# Patient Record
Sex: MALE | Race: WHITE | HISPANIC OR LATINO | Employment: UNEMPLOYED | URBAN - METROPOLITAN AREA
[De-identification: names, ages, dates, MRNs, and addresses within clinical notes are randomized per-mention and may not be internally consistent; named-entity substitution may affect disease eponyms.]

---

## 2017-05-05 ENCOUNTER — TRANSCRIBE ORDERS (OUTPATIENT)
Dept: RADIOLOGY | Facility: HOSPITAL | Age: 67
End: 2017-05-05

## 2017-05-05 ENCOUNTER — HOSPITAL ENCOUNTER (OUTPATIENT)
Dept: RADIOLOGY | Facility: HOSPITAL | Age: 67
Discharge: HOME | End: 2017-05-05
Attending: INTERNAL MEDICINE
Payer: COMMERCIAL

## 2017-05-05 DIAGNOSIS — R05.9 COUGH: Primary | ICD-10-CM

## 2017-05-05 DIAGNOSIS — R05.9 COUGH: ICD-10-CM

## 2017-05-05 PROCEDURE — 71020 HB CHEST X-RAY 2VW FRONTAL&LATL: CPT

## 2017-12-06 ENCOUNTER — GENERIC CONVERSION - ENCOUNTER (OUTPATIENT)
Dept: OTHER | Facility: OTHER | Age: 67
End: 2017-12-06

## 2017-12-06 DIAGNOSIS — Z00.00 ENCOUNTER FOR GENERAL ADULT MEDICAL EXAMINATION WITHOUT ABNORMAL FINDINGS: ICD-10-CM

## 2017-12-06 DIAGNOSIS — Z12.5 ENCOUNTER FOR SCREENING FOR MALIGNANT NEOPLASM OF PROSTATE: ICD-10-CM

## 2017-12-27 ENCOUNTER — APPOINTMENT (OUTPATIENT)
Dept: LAB | Facility: CLINIC | Age: 67
End: 2017-12-27
Payer: COMMERCIAL

## 2017-12-27 DIAGNOSIS — Z00.00 ENCOUNTER FOR GENERAL ADULT MEDICAL EXAMINATION WITHOUT ABNORMAL FINDINGS: ICD-10-CM

## 2017-12-27 DIAGNOSIS — Z12.5 ENCOUNTER FOR SCREENING FOR MALIGNANT NEOPLASM OF PROSTATE: ICD-10-CM

## 2017-12-27 LAB
ALBUMIN SERPL BCP-MCNC: 4.3 G/DL (ref 3.5–5)
ALP SERPL-CCNC: 68 U/L (ref 46–116)
ALT SERPL W P-5'-P-CCNC: 20 U/L (ref 12–78)
ANION GAP SERPL CALCULATED.3IONS-SCNC: 7 MMOL/L (ref 4–13)
AST SERPL W P-5'-P-CCNC: 12 U/L (ref 5–45)
BILIRUB SERPL-MCNC: 0.49 MG/DL (ref 0.2–1)
BUN SERPL-MCNC: 15 MG/DL (ref 5–25)
CALCIUM SERPL-MCNC: 9.4 MG/DL (ref 8.3–10.1)
CHLORIDE SERPL-SCNC: 104 MMOL/L (ref 100–108)
CO2 SERPL-SCNC: 27 MMOL/L (ref 21–32)
CREAT SERPL-MCNC: 0.76 MG/DL (ref 0.6–1.3)
GFR SERPL CREATININE-BSD FRML MDRD: 96 ML/MIN/1.73SQ M
GLUCOSE P FAST SERPL-MCNC: 89 MG/DL (ref 65–99)
POTASSIUM SERPL-SCNC: 4.2 MMOL/L (ref 3.5–5.3)
PROT SERPL-MCNC: 8.6 G/DL (ref 6.4–8.2)
PSA SERPL-MCNC: 0.4 NG/ML (ref 0–4)
SODIUM SERPL-SCNC: 138 MMOL/L (ref 136–145)

## 2017-12-27 PROCEDURE — 80053 COMPREHEN METABOLIC PANEL: CPT

## 2017-12-27 PROCEDURE — 36415 COLL VENOUS BLD VENIPUNCTURE: CPT

## 2017-12-27 PROCEDURE — G0103 PSA SCREENING: HCPCS

## 2018-01-16 ENCOUNTER — APPOINTMENT (OUTPATIENT)
Dept: LAB | Facility: CLINIC | Age: 68
End: 2018-01-16
Payer: COMMERCIAL

## 2018-01-16 DIAGNOSIS — E88.09 OTHER DISORDERS OF PLASMA-PROTEIN METABOLISM, NOT ELSEWHERE CLASSIFIED: ICD-10-CM

## 2018-01-16 LAB
ALBUMIN SERPL BCP-MCNC: 4.3 G/DL (ref 3.5–5)
ALP SERPL-CCNC: 60 U/L (ref 46–116)
ALT SERPL W P-5'-P-CCNC: 23 U/L (ref 12–78)
ANION GAP SERPL CALCULATED.3IONS-SCNC: 7 MMOL/L (ref 4–13)
AST SERPL W P-5'-P-CCNC: 9 U/L (ref 5–45)
BILIRUB SERPL-MCNC: 0.62 MG/DL (ref 0.2–1)
BUN SERPL-MCNC: 12 MG/DL (ref 5–25)
CALCIUM SERPL-MCNC: 9.2 MG/DL (ref 8.3–10.1)
CHLORIDE SERPL-SCNC: 106 MMOL/L (ref 100–108)
CO2 SERPL-SCNC: 27 MMOL/L (ref 21–32)
CREAT SERPL-MCNC: 0.7 MG/DL (ref 0.6–1.3)
GFR SERPL CREATININE-BSD FRML MDRD: 99 ML/MIN/1.73SQ M
GLUCOSE P FAST SERPL-MCNC: 91 MG/DL (ref 65–99)
POTASSIUM SERPL-SCNC: 3.8 MMOL/L (ref 3.5–5.3)
PROT SERPL-MCNC: 8.4 G/DL (ref 6.4–8.2)
SODIUM SERPL-SCNC: 140 MMOL/L (ref 136–145)

## 2018-01-16 PROCEDURE — 36415 COLL VENOUS BLD VENIPUNCTURE: CPT

## 2018-01-16 PROCEDURE — 80053 COMPREHEN METABOLIC PANEL: CPT

## 2018-01-24 VITALS
HEART RATE: 120 BPM | WEIGHT: 171.52 LBS | HEIGHT: 65 IN | SYSTOLIC BLOOD PRESSURE: 130 MMHG | BODY MASS INDEX: 28.58 KG/M2 | DIASTOLIC BLOOD PRESSURE: 90 MMHG | TEMPERATURE: 98.4 F

## 2018-03-05 ENCOUNTER — TELEPHONE (OUTPATIENT)
Dept: INTERNAL MEDICINE CLINIC | Facility: CLINIC | Age: 68
End: 2018-03-05

## 2018-09-24 ENCOUNTER — OFFICE VISIT (OUTPATIENT)
Dept: INTERNAL MEDICINE CLINIC | Facility: CLINIC | Age: 68
End: 2018-09-24

## 2018-09-24 VITALS
WEIGHT: 170.64 LBS | SYSTOLIC BLOOD PRESSURE: 136 MMHG | TEMPERATURE: 98.9 F | DIASTOLIC BLOOD PRESSURE: 90 MMHG | HEART RATE: 96 BPM | BODY MASS INDEX: 28.4 KG/M2

## 2018-09-24 DIAGNOSIS — N40.0 BPH (BENIGN PROSTATIC HYPERPLASIA): Primary | ICD-10-CM

## 2018-09-24 DIAGNOSIS — R35.1 BPH ASSOCIATED WITH NOCTURIA: ICD-10-CM

## 2018-09-24 DIAGNOSIS — N40.1 BPH ASSOCIATED WITH NOCTURIA: ICD-10-CM

## 2018-09-24 PROCEDURE — 99212 OFFICE O/P EST SF 10 MIN: CPT | Performed by: INTERNAL MEDICINE

## 2018-09-24 RX ORDER — DOXAZOSIN 2 MG/1
2 TABLET ORAL
Qty: 90 TABLET | Refills: 1 | Status: SHIPPED | OUTPATIENT
Start: 2018-09-24 | End: 2018-12-04 | Stop reason: SINTOL

## 2018-09-24 NOTE — PROGRESS NOTES
Follow-up visit    ASSESSMENT/PLAN:  Diagnoses and all orders for this visit:      BPH associated with nocturia  · Patient having worsening symptoms of weak stream and nocturia for the past few months  · Will start patient on alpha-1 blocker to take one hour prior to sleeping  · Patient wants referral for Urology for evaluation  · Deferred rectal exam today  -     Ambulatory referral to Urology; Future  -     doxazosin (CARDURA) 2 mg tablet; Take 1 tablet (2 mg total) by mouth daily at bedtime Take 1 hour before bed      Health maintenance:  Secondary to no insurance patient refuses vaccinations  He has not had his colonoscopy performed  HISTORY OF PRESENTING ILLNESS:  Mary Lawson is a 79 y o  with PMH significant for benign prostatic hyperplasia and former nicotine use  Mary Lawson is in clinic today for prostate issues  Patient reports for the past few months he has had a weakened stream with urination  Patient also reports that he is nocturia with frequent awakening in the middle of the night  Patient reports he is able to have an erection however he reports that he gets a tingling sensation in his inner thigh and perineum area  He also intermittently reports his infection is not as strong as he used to be  He denies any dysuria, urinary frequency, penile discharge  Patient did not take any medications on a regular basis  Patient does not smoke  Patient has social alcohol use  Review of Systems   Constitutional: Negative for activity change (Chronic), appetite change, chills, fatigue, fever and unexpected weight change  HENT: Negative for trouble swallowing  Eyes: Negative for visual disturbance  Respiratory: Negative for apnea, cough, choking, shortness of breath and wheezing  Cardiovascular: Negative for chest pain, palpitations and leg swelling  Gastrointestinal: Negative for abdominal pain, constipation, diarrhea and vomiting     Genitourinary: Positive for decreased urine volume  Negative for dysuria  Nocturia     Musculoskeletal: Negative for arthralgias  Skin: Negative for color change  Neurological: Negative for weakness and headaches  Hematological: Negative for adenopathy  Psychiatric/Behavioral: Negative for agitation and behavioral problems  none    This patient  has a past surgical history that includes Hernia repair  Former smoker        OBJECTIVE:  Vitals:    09/24/18 1517   BP: 136/90   Pulse: 96   Temp: 98 9 °F (37 2 °C)   Weight: 77 4 kg (170 lb 10 2 oz)     Physical Exam   Constitutional: He is oriented to person, place, and time  He appears well-developed and well-nourished  No distress  HENT:   Head: Normocephalic and atraumatic  Mouth/Throat: No oropharyngeal exudate  Eyes: Conjunctivae are normal  Pupils are equal, round, and reactive to light  Cardiovascular: Normal rate, regular rhythm, normal heart sounds and intact distal pulses  Exam reveals no gallop and no friction rub  No murmur heard  Pulmonary/Chest: Effort normal and breath sounds normal  No respiratory distress  He has no wheezes  He has no rales  He exhibits no tenderness  Abdominal: Soft  Bowel sounds are normal  He exhibits no distension  There is no tenderness  There is no guarding  Genitourinary:   Genitourinary Comments: Patient refused   Musculoskeletal: Normal range of motion  He exhibits no edema or tenderness  Lymphadenopathy:     He has no cervical adenopathy  Neurological: He is alert and oriented to person, place, and time  No cranial nerve deficit  Skin: Skin is warm and dry  He is not diaphoretic  No erythema  Psychiatric: He has a normal mood and affect  His behavior is normal    Vitals reviewed          Current Outpatient Prescriptions:     bisacodyl (DULCOLAX) 5 mg EC tablet, Take by mouth, Disp: , Rfl:     doxazosin (CARDURA) 2 mg tablet, Take 1 tablet (2 mg total) by mouth daily at bedtime Take 1 hour before bed, Disp: 90 tablet, Rfl: 1    PEG 3350-KCl-NaBcb-NaCl-NaSulf (GOLYTELY) 227 1 g PACK, Take by mouth, Disp: , Rfl:     Past Medical History:   Diagnosis Date    Medical history unknown      Past Surgical History:   Procedure Laterality Date    HERNIA REPAIR      Last Assessed: 12/6/2017     Social History     Social History    Marital status: Single     Spouse name: N/A    Number of children: N/A    Years of education: N/A     Occupational History    Not on file  Social History Main Topics    Smoking status: Current Some Day Smoker    Smokeless tobacco: Never Used    Alcohol use Yes      Comment: Social Drinker     Drug use: No    Sexual activity: Not on file     Other Topics Concern    Not on file     Social History Narrative    No narrative on file     Family History   Problem Relation Age of Onset    Parkinsonism Mother         Symptomatic Parkinson Disease     Coronary artery disease Father     Heart attack Father        ==  Brad Leiva MD   Internal Medicine PGY-2  9/24/2018 4:45 PM    [address] [facility] , [address] [address] [facility] [facility]  Office: [phone]  Fax: [phone]

## 2018-12-04 ENCOUNTER — OFFICE VISIT (OUTPATIENT)
Dept: MULTI SPECIALTY CLINIC | Facility: CLINIC | Age: 68
End: 2018-12-04

## 2018-12-04 VITALS
WEIGHT: 171.96 LBS | DIASTOLIC BLOOD PRESSURE: 88 MMHG | BODY MASS INDEX: 28.65 KG/M2 | HEIGHT: 65 IN | HEART RATE: 84 BPM | TEMPERATURE: 97.6 F | SYSTOLIC BLOOD PRESSURE: 144 MMHG

## 2018-12-04 DIAGNOSIS — R35.1 BPH ASSOCIATED WITH NOCTURIA: ICD-10-CM

## 2018-12-04 DIAGNOSIS — N40.1 BPH ASSOCIATED WITH NOCTURIA: ICD-10-CM

## 2018-12-04 PROCEDURE — 99242 OFF/OP CONSLTJ NEW/EST SF 20: CPT | Performed by: UROLOGY

## 2018-12-04 RX ORDER — TAMSULOSIN HYDROCHLORIDE 0.4 MG/1
0.4 CAPSULE ORAL
Qty: 30 CAPSULE | Refills: 5 | Status: SHIPPED | OUTPATIENT
Start: 2018-12-04 | End: 2020-06-05

## 2018-12-04 NOTE — PROGRESS NOTES
Assessment/Plan:    No problem-specific Assessment & Plan notes found for this encounter  Diagnoses and all orders for this visit:    BPH associated with nocturia  -     Ambulatory referral to Urology  -     tamsulosin (FLOMAX) 0 4 mg; Take 1 capsule (0 4 mg total) by mouth daily with dinner  -     PSA Total, Diagnostic; Future          Subjective:      Patient ID: Sona Lopez is a 79 y o  male  Sona Lopez is a 57-year-old male who is referred for evaluation of BPH symptoms  Patient had been on Cardura 2 mg HS however stopped it 2 months later because of flank pain  He states his flank pain did subside after with discontinuing the drug  At this time he complains of relatively mild symptoms  He states he has a "low force" of his urinary stream   He only wakes 1-2 times nightly depending upon how much he drinks closer to bedtime  He denies dysuria, frequency, urgency, hematuria, or history of urinary tract infections  He also complains that at the end of ejaculation he gets a 'strange' feeling in the upper thighs, medial aspect that last for 1-2 seconds,that  he describes is " like a tremor"  His last PSA on December 27, 2017 was 0 4  The following portions of the patient's history were reviewed and updated as appropriate: allergies, current medications, past family history, past medical history, past social history, past surgical history and problem list     Review of Systems   Constitutional: Negative for appetite change, chills and fever  HENT: Negative  Eyes: Negative  Respiratory: Negative for cough, shortness of breath and wheezing  Cardiovascular: Negative for chest pain and leg swelling  Gastrointestinal: Negative for abdominal pain, nausea and vomiting  Endocrine: Negative  Genitourinary: Positive for difficulty urinating  Negative for decreased urine volume, dysuria, flank pain, frequency, hematuria and urgency  Musculoskeletal: Negative  Skin: Negative  Allergic/Immunologic: Negative  Hematological: Negative  Psychiatric/Behavioral: Negative  Objective:      /88 (BP Location: Right arm, Patient Position: Sitting, Cuff Size: Adult)   Pulse 84   Temp 97 6 °F (36 4 °C) (Oral)   Ht 5' 5" (1 651 m)   Wt 78 kg (171 lb 15 3 oz)   BMI 28 62 kg/m²          Physical Exam   Constitutional: He is oriented to person, place, and time  He appears well-developed and well-nourished  Cardiovascular: Normal rate, regular rhythm and normal heart sounds  Pulmonary/Chest: Effort normal and breath sounds normal    Abdominal: Soft  He exhibits no distension and no mass  There is no tenderness  There is no rebound and no guarding  Genitourinary:   Genitourinary Comments: Penis normal, circumcised, testes normal, prostate slightly enlarged but smooth without any nodules   Musculoskeletal: Normal range of motion  Neurological: He is alert and oriented to person, place, and time  Skin: Skin is warm and dry  This text is generated with voice recognition software  There may be translation, syntax,  or grammatical errors  If you have any questions, please contact the dictating provider        Sosa Duarte PA-C

## 2018-12-11 ENCOUNTER — APPOINTMENT (OUTPATIENT)
Dept: LAB | Facility: CLINIC | Age: 68
End: 2018-12-11

## 2018-12-11 DIAGNOSIS — N40.1 BPH ASSOCIATED WITH NOCTURIA: ICD-10-CM

## 2018-12-11 DIAGNOSIS — R35.1 BPH ASSOCIATED WITH NOCTURIA: ICD-10-CM

## 2018-12-11 LAB — PSA SERPL-MCNC: 0.4 NG/ML (ref 0–4)

## 2018-12-11 PROCEDURE — 84153 ASSAY OF PSA TOTAL: CPT

## 2019-05-29 ENCOUNTER — TELEPHONE (OUTPATIENT)
Dept: MULTI SPECIALTY CLINIC | Facility: CLINIC | Age: 69
End: 2019-05-29

## 2019-05-29 DIAGNOSIS — R35.1 BPH ASSOCIATED WITH NOCTURIA: Primary | ICD-10-CM

## 2019-05-29 DIAGNOSIS — N40.1 BPH ASSOCIATED WITH NOCTURIA: Primary | ICD-10-CM

## 2019-08-14 ENCOUNTER — TELEPHONE (OUTPATIENT)
Dept: INTERNAL MEDICINE CLINIC | Facility: CLINIC | Age: 69
End: 2019-08-14

## 2019-08-14 NOTE — TELEPHONE ENCOUNTER
Called and SANTIOM asking the patient if he is planning on scheduling his CRC  Advised him to give us call back at the office

## 2020-03-04 ENCOUNTER — APPOINTMENT (OUTPATIENT)
Dept: LAB | Facility: CLINIC | Age: 70
End: 2020-03-04

## 2020-03-04 DIAGNOSIS — I10 ESSENTIAL HYPERTENSION: Primary | ICD-10-CM

## 2020-03-04 DIAGNOSIS — N40.1 BENIGN PROSTATIC HYPERPLASIA WITH LOWER URINARY TRACT SYMPTOMS, SYMPTOM DETAILS UNSPECIFIED: ICD-10-CM

## 2020-03-04 LAB
ALBUMIN SERPL BCP-MCNC: 4 G/DL (ref 3.5–5)
ALP SERPL-CCNC: 61 U/L (ref 46–116)
ALT SERPL W P-5'-P-CCNC: 24 U/L (ref 12–78)
ANION GAP SERPL CALCULATED.3IONS-SCNC: 5 MMOL/L (ref 4–13)
AST SERPL W P-5'-P-CCNC: 10 U/L (ref 5–45)
BACTERIA UR QL AUTO: ABNORMAL /HPF
BASOPHILS # BLD AUTO: 0.07 THOUSANDS/ΜL (ref 0–0.1)
BASOPHILS NFR BLD AUTO: 1 % (ref 0–1)
BILIRUB SERPL-MCNC: 0.61 MG/DL (ref 0.2–1)
BILIRUB UR QL STRIP: NEGATIVE
BUN SERPL-MCNC: 16 MG/DL (ref 5–25)
CALCIUM SERPL-MCNC: 8.8 MG/DL (ref 8.3–10.1)
CHLORIDE SERPL-SCNC: 108 MMOL/L (ref 100–108)
CHOLEST SERPL-MCNC: 203 MG/DL (ref 50–200)
CLARITY UR: ABNORMAL
CO2 SERPL-SCNC: 26 MMOL/L (ref 21–32)
COLOR UR: YELLOW
CREAT SERPL-MCNC: 0.74 MG/DL (ref 0.6–1.3)
EOSINOPHIL # BLD AUTO: 0.17 THOUSAND/ΜL (ref 0–0.61)
EOSINOPHIL NFR BLD AUTO: 2 % (ref 0–6)
ERYTHROCYTE [DISTWIDTH] IN BLOOD BY AUTOMATED COUNT: 13.2 % (ref 11.6–15.1)
GFR SERPL CREATININE-BSD FRML MDRD: 95 ML/MIN/1.73SQ M
GLUCOSE P FAST SERPL-MCNC: 96 MG/DL (ref 65–99)
GLUCOSE UR STRIP-MCNC: NEGATIVE MG/DL
HCT VFR BLD AUTO: 43.8 % (ref 36.5–49.3)
HDLC SERPL-MCNC: 56 MG/DL
HGB BLD-MCNC: 14 G/DL (ref 12–17)
HGB UR QL STRIP.AUTO: ABNORMAL
HYALINE CASTS #/AREA URNS LPF: ABNORMAL /LPF
IMM GRANULOCYTES # BLD AUTO: 0.04 THOUSAND/UL (ref 0–0.2)
IMM GRANULOCYTES NFR BLD AUTO: 0 % (ref 0–2)
KETONES UR STRIP-MCNC: NEGATIVE MG/DL
LDLC SERPL CALC-MCNC: 122 MG/DL (ref 0–100)
LEUKOCYTE ESTERASE UR QL STRIP: NEGATIVE
LYMPHOCYTES # BLD AUTO: 3.19 THOUSANDS/ΜL (ref 0.6–4.47)
LYMPHOCYTES NFR BLD AUTO: 35 % (ref 14–44)
MCH RBC QN AUTO: 27.1 PG (ref 26.8–34.3)
MCHC RBC AUTO-ENTMCNC: 32 G/DL (ref 31.4–37.4)
MCV RBC AUTO: 85 FL (ref 82–98)
MONOCYTES # BLD AUTO: 0.82 THOUSAND/ΜL (ref 0.17–1.22)
MONOCYTES NFR BLD AUTO: 9 % (ref 4–12)
NEUTROPHILS # BLD AUTO: 4.87 THOUSANDS/ΜL (ref 1.85–7.62)
NEUTS SEG NFR BLD AUTO: 53 % (ref 43–75)
NITRITE UR QL STRIP: NEGATIVE
NON-SQ EPI CELLS URNS QL MICRO: ABNORMAL /HPF
NONHDLC SERPL-MCNC: 147 MG/DL
NRBC BLD AUTO-RTO: 0 /100 WBCS
PH UR STRIP.AUTO: 5.5 [PH]
PLATELET # BLD AUTO: 312 THOUSANDS/UL (ref 149–390)
PMV BLD AUTO: 10.2 FL (ref 8.9–12.7)
POTASSIUM SERPL-SCNC: 3.9 MMOL/L (ref 3.5–5.3)
PROT SERPL-MCNC: 7.8 G/DL (ref 6.4–8.2)
PROT UR STRIP-MCNC: NEGATIVE MG/DL
PSA SERPL-MCNC: 0.6 NG/ML (ref 0–4)
RBC # BLD AUTO: 5.16 MILLION/UL (ref 3.88–5.62)
RBC #/AREA URNS AUTO: ABNORMAL /HPF
SODIUM SERPL-SCNC: 139 MMOL/L (ref 136–145)
SP GR UR STRIP.AUTO: 1.03 (ref 1–1.03)
TRIGL SERPL-MCNC: 127 MG/DL
UROBILINOGEN UR QL STRIP.AUTO: 0.2 E.U./DL
WBC # BLD AUTO: 9.16 THOUSAND/UL (ref 4.31–10.16)
WBC #/AREA URNS AUTO: ABNORMAL /HPF

## 2020-03-04 PROCEDURE — 81001 URINALYSIS AUTO W/SCOPE: CPT

## 2020-03-04 PROCEDURE — G0103 PSA SCREENING: HCPCS

## 2020-03-04 PROCEDURE — 80061 LIPID PANEL: CPT

## 2020-03-04 PROCEDURE — 80053 COMPREHEN METABOLIC PANEL: CPT

## 2020-03-04 PROCEDURE — 36415 COLL VENOUS BLD VENIPUNCTURE: CPT

## 2020-03-04 PROCEDURE — 85025 COMPLETE CBC W/AUTO DIFF WBC: CPT

## 2020-04-23 ENCOUNTER — TELEPHONE (OUTPATIENT)
Dept: INTERNAL MEDICINE CLINIC | Facility: CLINIC | Age: 70
End: 2020-04-23

## 2020-06-04 ENCOUNTER — TELEPHONE (OUTPATIENT)
Dept: INTERNAL MEDICINE CLINIC | Facility: CLINIC | Age: 70
End: 2020-06-04

## 2020-06-05 ENCOUNTER — OFFICE VISIT (OUTPATIENT)
Dept: INTERNAL MEDICINE CLINIC | Facility: CLINIC | Age: 70
End: 2020-06-05

## 2020-06-05 VITALS
TEMPERATURE: 98.1 F | BODY MASS INDEX: 25.67 KG/M2 | HEIGHT: 67 IN | SYSTOLIC BLOOD PRESSURE: 158 MMHG | HEART RATE: 62 BPM | WEIGHT: 163.58 LBS | DIASTOLIC BLOOD PRESSURE: 88 MMHG

## 2020-06-05 DIAGNOSIS — N40.1 BPH ASSOCIATED WITH NOCTURIA: ICD-10-CM

## 2020-06-05 DIAGNOSIS — N52.9 ERECTILE DYSFUNCTION, UNSPECIFIED ERECTILE DYSFUNCTION TYPE: ICD-10-CM

## 2020-06-05 DIAGNOSIS — E78.5 HYPERLIPIDEMIA, UNSPECIFIED HYPERLIPIDEMIA TYPE: ICD-10-CM

## 2020-06-05 DIAGNOSIS — M19.041 OSTEOARTHRITIS OF BOTH HANDS, UNSPECIFIED OSTEOARTHRITIS TYPE: Primary | ICD-10-CM

## 2020-06-05 DIAGNOSIS — I10 ESSENTIAL HYPERTENSION: ICD-10-CM

## 2020-06-05 DIAGNOSIS — R35.1 BPH ASSOCIATED WITH NOCTURIA: ICD-10-CM

## 2020-06-05 DIAGNOSIS — M19.042 OSTEOARTHRITIS OF BOTH HANDS, UNSPECIFIED OSTEOARTHRITIS TYPE: Primary | ICD-10-CM

## 2020-06-05 PROCEDURE — 3077F SYST BP >= 140 MM HG: CPT | Performed by: INTERNAL MEDICINE

## 2020-06-05 PROCEDURE — 3079F DIAST BP 80-89 MM HG: CPT | Performed by: INTERNAL MEDICINE

## 2020-06-05 PROCEDURE — 99213 OFFICE O/P EST LOW 20 MIN: CPT | Performed by: INTERNAL MEDICINE

## 2020-06-05 PROCEDURE — 1160F RVW MEDS BY RX/DR IN RCRD: CPT | Performed by: INTERNAL MEDICINE

## 2020-06-05 PROCEDURE — 3008F BODY MASS INDEX DOCD: CPT | Performed by: INTERNAL MEDICINE

## 2020-06-05 PROCEDURE — 1036F TOBACCO NON-USER: CPT | Performed by: INTERNAL MEDICINE

## 2020-06-05 PROCEDURE — 4040F PNEUMOC VAC/ADMIN/RCVD: CPT | Performed by: INTERNAL MEDICINE

## 2020-06-05 RX ORDER — ACETAMINOPHEN 500 MG
1000 TABLET ORAL 2 TIMES DAILY PRN
Qty: 60 TABLET | Refills: 2 | Status: SHIPPED | OUTPATIENT
Start: 2020-06-05

## 2020-06-05 RX ORDER — SIMVASTATIN 20 MG
20 TABLET ORAL
Qty: 30 TABLET | Refills: 3 | Status: SHIPPED | OUTPATIENT
Start: 2020-06-05 | End: 2020-11-09 | Stop reason: ALTCHOICE

## 2020-06-05 RX ORDER — TADALAFIL 5 MG/1
5 TABLET ORAL DAILY PRN
Qty: 10 TABLET | Refills: 1 | Status: SHIPPED | OUTPATIENT
Start: 2020-06-05 | End: 2021-07-22 | Stop reason: SDUPTHER

## 2020-06-05 RX ORDER — DOXAZOSIN MESYLATE 1 MG/1
1 TABLET ORAL
Qty: 30 TABLET | Refills: 3 | Status: SHIPPED | OUTPATIENT
Start: 2020-06-05 | End: 2020-11-09 | Stop reason: ALTCHOICE

## 2020-11-09 ENCOUNTER — OFFICE VISIT (OUTPATIENT)
Dept: INTERNAL MEDICINE CLINIC | Facility: CLINIC | Age: 70
End: 2020-11-09

## 2020-11-09 VITALS
SYSTOLIC BLOOD PRESSURE: 144 MMHG | WEIGHT: 153.8 LBS | TEMPERATURE: 97.9 F | HEIGHT: 67 IN | DIASTOLIC BLOOD PRESSURE: 86 MMHG | BODY MASS INDEX: 24.14 KG/M2

## 2020-11-09 DIAGNOSIS — E78.5 HYPERLIPIDEMIA: ICD-10-CM

## 2020-11-09 DIAGNOSIS — Z23 NEED FOR PNEUMOCOCCAL VACCINATION: ICD-10-CM

## 2020-11-09 DIAGNOSIS — Z00.00 HEALTHCARE MAINTENANCE: ICD-10-CM

## 2020-11-09 DIAGNOSIS — M19.041 OSTEOARTHRITIS OF BOTH HANDS: ICD-10-CM

## 2020-11-09 DIAGNOSIS — R35.1 BPH ASSOCIATED WITH NOCTURIA: Primary | ICD-10-CM

## 2020-11-09 DIAGNOSIS — N40.1 BPH ASSOCIATED WITH NOCTURIA: Primary | ICD-10-CM

## 2020-11-09 DIAGNOSIS — M19.042 OSTEOARTHRITIS OF BOTH HANDS: ICD-10-CM

## 2020-11-09 PROCEDURE — 99214 OFFICE O/P EST MOD 30 MIN: CPT | Performed by: INTERNAL MEDICINE

## 2020-11-09 PROCEDURE — 90471 IMMUNIZATION ADMIN: CPT | Performed by: INTERNAL MEDICINE

## 2020-11-09 PROCEDURE — 90732 PPSV23 VACC 2 YRS+ SUBQ/IM: CPT | Performed by: INTERNAL MEDICINE

## 2020-11-09 RX ORDER — DOXAZOSIN MESYLATE 1 MG/1
1 TABLET ORAL
Qty: 90 TABLET | Refills: 3 | Status: SHIPPED | OUTPATIENT
Start: 2020-11-09 | End: 2021-11-09

## 2020-11-09 RX ORDER — ATORVASTATIN CALCIUM 40 MG/1
40 TABLET, FILM COATED ORAL DAILY
Qty: 90 TABLET | Refills: 3 | Status: SHIPPED | OUTPATIENT
Start: 2020-11-09 | End: 2020-11-09 | Stop reason: SDUPTHER

## 2020-11-09 RX ORDER — DOXAZOSIN MESYLATE 1 MG/1
1 TABLET ORAL
Qty: 90 TABLET | Refills: 3 | Status: SHIPPED | OUTPATIENT
Start: 2020-11-09 | End: 2020-11-09 | Stop reason: SDUPTHER

## 2020-11-09 RX ORDER — ATORVASTATIN CALCIUM 40 MG/1
40 TABLET, FILM COATED ORAL DAILY
Qty: 90 TABLET | Refills: 3 | Status: SHIPPED | OUTPATIENT
Start: 2020-11-09

## 2020-11-14 ENCOUNTER — LAB (OUTPATIENT)
Dept: LAB | Facility: HOSPITAL | Age: 70
End: 2020-11-14
Payer: COMMERCIAL

## 2020-11-14 DIAGNOSIS — Z00.00 HEALTHCARE MAINTENANCE: ICD-10-CM

## 2020-11-14 LAB
ALBUMIN SERPL BCP-MCNC: 3.9 G/DL (ref 3.5–5)
ALP SERPL-CCNC: 59 U/L (ref 46–116)
ALT SERPL W P-5'-P-CCNC: 22 U/L (ref 12–78)
ANION GAP SERPL CALCULATED.3IONS-SCNC: 5 MMOL/L (ref 4–13)
AST SERPL W P-5'-P-CCNC: 7 U/L (ref 5–45)
BASOPHILS # BLD AUTO: 0.06 THOUSANDS/ΜL (ref 0–0.1)
BASOPHILS NFR BLD AUTO: 1 % (ref 0–1)
BILIRUB SERPL-MCNC: 0.63 MG/DL (ref 0.2–1)
BUN SERPL-MCNC: 16 MG/DL (ref 5–25)
CALCIUM SERPL-MCNC: 9.1 MG/DL (ref 8.3–10.1)
CHLORIDE SERPL-SCNC: 107 MMOL/L (ref 100–108)
CO2 SERPL-SCNC: 27 MMOL/L (ref 21–32)
CREAT SERPL-MCNC: 0.64 MG/DL (ref 0.6–1.3)
EOSINOPHIL # BLD AUTO: 0.15 THOUSAND/ΜL (ref 0–0.61)
EOSINOPHIL NFR BLD AUTO: 2 % (ref 0–6)
ERYTHROCYTE [DISTWIDTH] IN BLOOD BY AUTOMATED COUNT: 13.3 % (ref 11.6–15.1)
GFR SERPL CREATININE-BSD FRML MDRD: 101 ML/MIN/1.73SQ M
GLUCOSE P FAST SERPL-MCNC: 93 MG/DL (ref 65–99)
HCT VFR BLD AUTO: 43.5 % (ref 36.5–49.3)
HCV AB SER QL: NORMAL
HGB BLD-MCNC: 13.6 G/DL (ref 12–17)
IMM GRANULOCYTES # BLD AUTO: 0.04 THOUSAND/UL (ref 0–0.2)
IMM GRANULOCYTES NFR BLD AUTO: 1 % (ref 0–2)
LYMPHOCYTES # BLD AUTO: 2.13 THOUSANDS/ΜL (ref 0.6–4.47)
LYMPHOCYTES NFR BLD AUTO: 29 % (ref 14–44)
MCH RBC QN AUTO: 26.7 PG (ref 26.8–34.3)
MCHC RBC AUTO-ENTMCNC: 31.3 G/DL (ref 31.4–37.4)
MCV RBC AUTO: 86 FL (ref 82–98)
MONOCYTES # BLD AUTO: 0.83 THOUSAND/ΜL (ref 0.17–1.22)
MONOCYTES NFR BLD AUTO: 11 % (ref 4–12)
NEUTROPHILS # BLD AUTO: 4.25 THOUSANDS/ΜL (ref 1.85–7.62)
NEUTS SEG NFR BLD AUTO: 56 % (ref 43–75)
NRBC BLD AUTO-RTO: 0 /100 WBCS
PLATELET # BLD AUTO: 367 THOUSANDS/UL (ref 149–390)
PMV BLD AUTO: 10.3 FL (ref 8.9–12.7)
POTASSIUM SERPL-SCNC: 3.9 MMOL/L (ref 3.5–5.3)
PROT SERPL-MCNC: 7.7 G/DL (ref 6.4–8.2)
RBC # BLD AUTO: 5.09 MILLION/UL (ref 3.88–5.62)
SODIUM SERPL-SCNC: 139 MMOL/L (ref 136–145)
WBC # BLD AUTO: 7.46 THOUSAND/UL (ref 4.31–10.16)

## 2020-11-14 PROCEDURE — 85025 COMPLETE CBC W/AUTO DIFF WBC: CPT

## 2020-11-14 PROCEDURE — 80053 COMPREHEN METABOLIC PANEL: CPT

## 2020-11-14 PROCEDURE — 86803 HEPATITIS C AB TEST: CPT

## 2020-11-14 PROCEDURE — 36415 COLL VENOUS BLD VENIPUNCTURE: CPT

## 2020-11-20 ENCOUNTER — APPOINTMENT (OUTPATIENT)
Dept: LAB | Facility: HOSPITAL | Age: 70
End: 2020-11-20

## 2020-11-20 DIAGNOSIS — Z00.00 HEALTHCARE MAINTENANCE: ICD-10-CM

## 2020-11-20 LAB — HEMOCCULT STL QL IA: NEGATIVE

## 2020-11-20 PROCEDURE — G0328 FECAL BLOOD SCRN IMMUNOASSAY: HCPCS

## 2021-01-14 ENCOUNTER — TELEPHONE (OUTPATIENT)
Dept: INTERNAL MEDICINE CLINIC | Facility: CLINIC | Age: 71
End: 2021-01-14

## 2021-01-14 NOTE — TELEPHONE ENCOUNTER
Pt is currently applying for medicare, he needs a letter stating that he has HTN, emailed to the daughter at [email or web address]

## 2021-01-15 NOTE — TELEPHONE ENCOUNTER
[place]   Write  A  Letter  To  Whom  It may  conern  That  The  Patient  Has  Been  Treated  For hypertension   Stephanie Osullivan

## 2021-01-26 ENCOUNTER — IMMUNIZATIONS (OUTPATIENT)
Dept: FAMILY MEDICINE CLINIC | Facility: HOSPITAL | Age: 71
End: 2021-01-26

## 2021-01-26 DIAGNOSIS — Z23 ENCOUNTER FOR IMMUNIZATION: Primary | ICD-10-CM

## 2021-01-26 PROCEDURE — 0001A SARS-COV-2 / COVID-19 MRNA VACCINE (PFIZER-BIONTECH) 30 MCG: CPT

## 2021-01-26 PROCEDURE — 91300 SARS-COV-2 / COVID-19 MRNA VACCINE (PFIZER-BIONTECH) 30 MCG: CPT

## 2021-02-18 ENCOUNTER — IMMUNIZATIONS (OUTPATIENT)
Dept: FAMILY MEDICINE CLINIC | Facility: HOSPITAL | Age: 71
End: 2021-02-18

## 2021-02-18 DIAGNOSIS — Z23 ENCOUNTER FOR IMMUNIZATION: Primary | ICD-10-CM

## 2021-02-18 PROCEDURE — 0002A SARS-COV-2 / COVID-19 MRNA VACCINE (PFIZER-BIONTECH) 30 MCG: CPT

## 2021-02-18 PROCEDURE — 91300 SARS-COV-2 / COVID-19 MRNA VACCINE (PFIZER-BIONTECH) 30 MCG: CPT

## 2021-07-22 ENCOUNTER — OFFICE VISIT (OUTPATIENT)
Dept: INTERNAL MEDICINE CLINIC | Facility: CLINIC | Age: 71
End: 2021-07-22

## 2021-07-22 ENCOUNTER — TELEPHONE (OUTPATIENT)
Dept: INTERNAL MEDICINE CLINIC | Facility: CLINIC | Age: 71
End: 2021-07-22

## 2021-07-22 VITALS
BODY MASS INDEX: 23.24 KG/M2 | WEIGHT: 148.4 LBS | SYSTOLIC BLOOD PRESSURE: 136 MMHG | TEMPERATURE: 97.1 F | DIASTOLIC BLOOD PRESSURE: 77 MMHG | HEART RATE: 74 BPM

## 2021-07-22 DIAGNOSIS — N52.9 ERECTILE DYSFUNCTION, UNSPECIFIED ERECTILE DYSFUNCTION TYPE: ICD-10-CM

## 2021-07-22 DIAGNOSIS — I10 ESSENTIAL HYPERTENSION: Primary | ICD-10-CM

## 2021-07-22 DIAGNOSIS — M19.041 OSTEOARTHRITIS OF BOTH HANDS, UNSPECIFIED OSTEOARTHRITIS TYPE: ICD-10-CM

## 2021-07-22 DIAGNOSIS — N40.1 BPH ASSOCIATED WITH NOCTURIA: ICD-10-CM

## 2021-07-22 DIAGNOSIS — M19.042 OSTEOARTHRITIS OF BOTH HANDS, UNSPECIFIED OSTEOARTHRITIS TYPE: ICD-10-CM

## 2021-07-22 DIAGNOSIS — Z12.12 SCREENING FOR COLORECTAL CANCER: ICD-10-CM

## 2021-07-22 DIAGNOSIS — Z12.5 SCREENING FOR PROSTATE CANCER: ICD-10-CM

## 2021-07-22 DIAGNOSIS — R35.1 BPH ASSOCIATED WITH NOCTURIA: ICD-10-CM

## 2021-07-22 DIAGNOSIS — Z12.11 SCREENING FOR COLORECTAL CANCER: ICD-10-CM

## 2021-07-22 PROCEDURE — 99213 OFFICE O/P EST LOW 20 MIN: CPT | Performed by: INTERNAL MEDICINE

## 2021-07-22 RX ORDER — TADALAFIL 5 MG/1
5 TABLET ORAL DAILY PRN
Qty: 10 TABLET | Refills: 1 | Status: SHIPPED | OUTPATIENT
Start: 2021-07-22 | End: 2021-11-09

## 2021-07-22 NOTE — TELEPHONE ENCOUNTER
Patient passed open access question  Can you please assist with scheduling procedure for October  Patient is Self Pay  Thank you

## 2021-07-22 NOTE — PROGRESS NOTES
INTERNAL MEDICINE OFFICE VISIT  [facility]  [address] [place] [address]    NAME: Agustina Strauss  AGE: 78 y o  SEX: male    DATE OF ENCOUNTER: 7/22/2021    Assessment and Plan     1  Essential hypertension  Patient's blood pressure was 136/77 at today's visit, improved from last few visits were -150s  He is adherent with his doxazosin medication after not being at the prior visits  --continue doxazosin 1mg qHS    2  Osteoarthritis of both hands, unspecified osteoarthritis type  Patient is a chronic history of osteoarthritis both hands, worse on the right with associated  weakness  Negative signs for carpal tunnel syndrome  Is well controlled with mobility exercises and Voltaren gel  - Diclofenac Sodium (VOLTAREN) 1 %; Apply 2 g topically 4 (four) times a day  Dispense: 350 g; Refill: 1    3  BPH associated with nocturia  Still experiences some symptoms, but doxazosin made it better  --  Continue doxazosin    4  Erectile dysfunction, unspecified erectile dysfunction type  Refilled patient's tadalafil  Instructed him not to take around the same time as doxazosin to prevent hypotensive episodes  Also counseled him that he should not take this medication every day and only as needed for sexual intercourse  - tadalafil (CIALIS) 5 MG tablet; Take 1 tablet (5 mg total) by mouth daily as needed for erectile dysfunction  Dispense: 10 tablet; Refill: 1    5  Screening for prostate cancer  Every year, the patient requests a PSA blood work  History of prostate cancer, and normal PSA is in the past   Will give labwork per patient request   - PSA, Total Screen; Future    6  Screening for colorectal cancer  The patient had been scheduled for a colonoscopy by past resident physicians  He even underwent all the prep, but due to issues with scheduling, he never had it done  No prior history of colonoscopy    Will give him a new referral to schedule with Gastroenterology  - Ambulatory referral to Gastroenterology; Future    Orders Placed This Encounter   Procedures    PSA, Total Screen    Ambulatory referral to Gastroenterology       Chief Complaint     Chief Complaint   Patient presents with    Follow-up       History of Present Illness       Patient is a 81-year-old male with a PMH bilateral hand osteoarthritis, hyperlipidemia, hypertension, BPH, and erectile dysfunction presents for follow-up of his chronic conditions  He is feeling well and has no complaints outside of his normal hand aches and pains  He says that pain gets worse with increased activity and sometimes at night if he has done a lot in the daytime  No issues with paresthesia or new onset weakness  Voltaren gel has helped his symptoms in the past  For his blood pressure, he continues to be adherent with doxazosin and reports no adverse effects  First hyperlipidemia he is also on atorvastatin and reports no adverse effects  He would like a refill of his Cialis  The following portions of the patient's history were reviewed and updated as appropriate: allergies, current medications, past family history, past medical history, past social history, past surgical history and problem list     Review of Systems     Review of Systems   Constitutional: Negative for chills and fever  HENT: Negative for ear pain and sore throat  Eyes: Negative for pain and visual disturbance  Respiratory: Negative for cough and shortness of breath  Cardiovascular: Negative for chest pain and palpitations  Gastrointestinal: Negative for abdominal pain and vomiting  Genitourinary: Negative for dysuria and hematuria  Musculoskeletal: Positive for arthralgias (In his hands) and myalgias  Negative for back pain  Skin: Negative for color change and rash  Neurological: Positive for weakness (Chronic right )  Negative for seizures and syncope     All other systems reviewed and are negative  Active Problem List     Patient Active Problem List   Diagnosis    BPH associated with nocturia    Osteoarthritis of both hands    Essential hypertension    Erectile dysfunction       Objective     /77 (BP Location: Left arm, Patient Position: Sitting, Cuff Size: Standard)   Pulse 74   Temp (!) 97 1 °F (36 2 °C) (Temporal)   Wt 67 3 kg (148 lb 6 4 oz)   BMI 23 24 kg/m²     Physical Exam  Vitals reviewed  Constitutional:       Appearance: Normal appearance  He is not ill-appearing, toxic-appearing or diaphoretic  HENT:      Head: Normocephalic and atraumatic  Mouth/Throat:      Mouth: Mucous membranes are moist       Pharynx: Oropharynx is clear  Eyes:      General: No scleral icterus  Conjunctiva/sclera: Conjunctivae normal    Cardiovascular:      Rate and Rhythm: Normal rate and regular rhythm  Heart sounds: No murmur heard  No gallop  Pulmonary:      Effort: Pulmonary effort is normal       Breath sounds: Normal breath sounds  No wheezing, rhonchi or rales  Abdominal:      General: Bowel sounds are normal  There is no distension  Palpations: Abdomen is soft  Tenderness: There is no abdominal tenderness  Musculoskeletal:         General: No swelling, tenderness, deformity or signs of injury  Right lower leg: No edema  Left lower leg: No edema  Comments: Decreased mobility in bilateral hands and wrists, no observable deformity or swelling; negative Tinel's and Phalen's sign bilaterally  Skin:     General: Skin is warm  Capillary Refill: Capillary refill takes less than 2 seconds  Coloration: Skin is not pale  Findings: No erythema or rash  Neurological:      General: No focal deficit present  Mental Status: He is alert and oriented to person, place, and time  Motor: Weakness (4/5 right ) present     Psychiatric:         Mood and Affect: Mood normal          Behavior: Behavior normal          Thought Content:  Thought content normal          Judgment: Judgment normal          Pertinent Laboratory/Diagnostic Studies:  CBC:   Lab Results   Component Value Date/Time    WBC 7 46 11/14/2020 10:00 AM    RBC 5 09 11/14/2020 10:00 AM    HGB 13 6 11/14/2020 10:00 AM    HCT 43 5 11/14/2020 10:00 AM    MCV 86 11/14/2020 10:00 AM    MCH 26 7 (L) 11/14/2020 10:00 AM    MCHC 31 3 (L) 11/14/2020 10:00 AM    RDW 13 3 11/14/2020 10:00 AM    MPV 10 3 11/14/2020 10:00 AM     11/14/2020 10:00 AM    NRBC 0 11/14/2020 10:00 AM    NEUTOPHILPCT 56 11/14/2020 10:00 AM    LYMPHOPCT 29 11/14/2020 10:00 AM    MONOPCT 11 11/14/2020 10:00 AM    EOSPCT 2 11/14/2020 10:00 AM    BASOPCT 1 11/14/2020 10:00 AM    NEUTROABS 4 25 11/14/2020 10:00 AM    LYMPHSABS 2 13 11/14/2020 10:00 AM    MONOSABS 0 83 11/14/2020 10:00 AM    EOSABS 0 15 11/14/2020 10:00 AM     Chemistry Profile:   Lab Results   Component Value Date/Time    K 3 9 11/14/2020 10:00 AM     11/14/2020 10:00 AM    CO2 27 11/14/2020 10:00 AM    BUN 16 11/14/2020 10:00 AM    CREATININE 0 64 11/14/2020 10:00 AM    GLUF 93 11/14/2020 10:00 AM    CALCIUM 9 1 11/14/2020 10:00 AM    AST 7 11/14/2020 10:00 AM    ALT 22 11/14/2020 10:00 AM    ALKPHOS 59 11/14/2020 10:00 AM    EGFR 101 11/14/2020 10:00 AM     Coagulation Studies: No results found for: PROTIME, INR, PTT  Cardiac Studies: No results found for: NTBNP, BNP, TROPONINI, POCTROP  Hepatology: No results found for: LIPASE, AMMONIA, AFP  Endocrine Studies:   Lab Results   Component Value Date/Time    TRIG 127 03/04/2020 08:14 AM    CHOLESTEROL 203 (H) 03/04/2020 08:14 AM    HDL 56 03/04/2020 08:14 AM    LDLCALC 122 (H) 03/04/2020 08:14 AM    NONHDLC 147 03/04/2020 08:14 AM     Iron Studies: No results found for: LABIRON, IRON, TIBC, FERRITIN  Health Maintenance:   Lab Results   Component Value Date/Time    PSA 0 6 03/04/2020 08:14 AM    HEPCAB Non-reactive 11/14/2020 10:00 AM         Current Medications     Current Outpatient Medications:     acetaminophen (TYLENOL) 500 mg tablet, Take 2 tablets (1,000 mg total) by mouth 2 (two) times a day as needed for mild pain, Disp: 60 tablet, Rfl: 2    atorvastatin (LIPITOR) 40 mg tablet, Take 1 tablet (40 mg total) by mouth daily, Disp: 90 tablet, Rfl: 3    doxazosin (CARDURA) 1 mg tablet, Take 1 tablet (1 mg total) by mouth daily at bedtime, Disp: 90 tablet, Rfl: 3    tadalafil (CIALIS) 5 MG tablet, Take 1 tablet (5 mg total) by mouth daily as needed for erectile dysfunction, Disp: 10 tablet, Rfl: 1    Diclofenac Sodium (VOLTAREN) 1 %, Apply 2 g topically 4 (four) times a day, Disp: 350 g, Rfl: 1    Health Maintenance     Health Maintenance   Topic Date Due    Annual Physical  Never done    Influenza Vaccine (1) 09/01/2021    Depression Screening PHQ  11/09/2021    BMI: Adult  11/09/2021    DTaP,Tdap,and Td Vaccines (1 - Tdap) 07/22/2022 (Originally 9/23/1971)    Fall Risk  11/09/2021    Colorectal Cancer Screening  11/20/2021    Hepatitis C Screening  Completed    Pneumococcal Vaccine: 65+ Years  Completed    COVID-19 Vaccine  Completed    HIB Vaccine  Aged Out    Hepatitis B Vaccine  Aged Out    IPV Vaccine  Aged Out    Hepatitis A Vaccine  Aged Out    Meningococcal ACWY Vaccine  Aged Out    HPV Vaccine  Aged Out     Immunization History   Administered Date(s) Administered    Influenza Quadrivalent, 6-35 Months IM 12/06/2017    Influenza, high dose seasonal 0 7 mL 10/22/2020    Pneumococcal Conjugate 13-Valent 12/06/2017    Pneumococcal Polysaccharide PPV23 11/09/2020    SARS-CoV-2 / COVID-19 mRNA IM (Pfizer-BioNTech) 01/26/2021, 02/18/2021       Radha Jeff DO  Internal Medicine  PGY-2  7/22/2021 4:30 PM

## 2021-11-09 ENCOUNTER — OFFICE VISIT (OUTPATIENT)
Dept: INTERNAL MEDICINE CLINIC | Facility: CLINIC | Age: 71
End: 2021-11-09

## 2021-11-09 VITALS
WEIGHT: 153.2 LBS | HEART RATE: 81 BPM | BODY MASS INDEX: 23.99 KG/M2 | SYSTOLIC BLOOD PRESSURE: 147 MMHG | OXYGEN SATURATION: 96 % | DIASTOLIC BLOOD PRESSURE: 92 MMHG | TEMPERATURE: 97.9 F

## 2021-11-09 DIAGNOSIS — I10 ESSENTIAL HYPERTENSION: Primary | ICD-10-CM

## 2021-11-09 DIAGNOSIS — R35.1 BPH ASSOCIATED WITH NOCTURIA: ICD-10-CM

## 2021-11-09 DIAGNOSIS — N40.1 BPH ASSOCIATED WITH NOCTURIA: ICD-10-CM

## 2021-11-09 PROCEDURE — 99213 OFFICE O/P EST LOW 20 MIN: CPT | Performed by: INTERNAL MEDICINE

## 2021-11-09 RX ORDER — TERAZOSIN 2 MG/1
2 CAPSULE ORAL
Qty: 30 CAPSULE | Refills: 1 | Status: SHIPPED | OUTPATIENT
Start: 2021-11-09 | End: 2022-01-10 | Stop reason: SDUPTHER

## 2021-12-08 ENCOUNTER — OFFICE VISIT (OUTPATIENT)
Dept: INTERNAL MEDICINE CLINIC | Facility: CLINIC | Age: 71
End: 2021-12-08

## 2021-12-08 VITALS
DIASTOLIC BLOOD PRESSURE: 76 MMHG | SYSTOLIC BLOOD PRESSURE: 129 MMHG | BODY MASS INDEX: 24.96 KG/M2 | TEMPERATURE: 98.2 F | WEIGHT: 159 LBS | HEIGHT: 67 IN | HEART RATE: 101 BPM

## 2021-12-08 DIAGNOSIS — I10 ESSENTIAL HYPERTENSION: ICD-10-CM

## 2021-12-08 DIAGNOSIS — M19.042 OSTEOARTHRITIS OF BOTH HANDS, UNSPECIFIED OSTEOARTHRITIS TYPE: ICD-10-CM

## 2021-12-08 DIAGNOSIS — Z12.12 SCREENING FOR COLORECTAL CANCER: ICD-10-CM

## 2021-12-08 DIAGNOSIS — M19.041 OSTEOARTHRITIS OF BOTH HANDS, UNSPECIFIED OSTEOARTHRITIS TYPE: ICD-10-CM

## 2021-12-08 DIAGNOSIS — Z12.11 SCREENING FOR COLORECTAL CANCER: ICD-10-CM

## 2021-12-08 DIAGNOSIS — N40.1 BPH ASSOCIATED WITH NOCTURIA: ICD-10-CM

## 2021-12-08 DIAGNOSIS — R35.1 BPH ASSOCIATED WITH NOCTURIA: ICD-10-CM

## 2021-12-08 DIAGNOSIS — N52.9 ERECTILE DYSFUNCTION, UNSPECIFIED ERECTILE DYSFUNCTION TYPE: ICD-10-CM

## 2021-12-08 DIAGNOSIS — Z23 NEED FOR INFLUENZA VACCINATION: Primary | ICD-10-CM

## 2021-12-08 PROCEDURE — 99213 OFFICE O/P EST LOW 20 MIN: CPT | Performed by: INTERNAL MEDICINE

## 2021-12-08 PROCEDURE — 90662 IIV NO PRSV INCREASED AG IM: CPT | Performed by: INTERNAL MEDICINE

## 2021-12-08 PROCEDURE — 90471 IMMUNIZATION ADMIN: CPT | Performed by: INTERNAL MEDICINE

## 2021-12-08 RX ORDER — TADALAFIL 5 MG/1
5 TABLET ORAL DAILY PRN
Qty: 10 TABLET | Refills: 0 | Status: SHIPPED | OUTPATIENT
Start: 2021-12-08

## 2021-12-30 ENCOUNTER — APPOINTMENT (OUTPATIENT)
Dept: LAB | Facility: HOSPITAL | Age: 71
End: 2021-12-30
Attending: INTERNAL MEDICINE

## 2021-12-30 DIAGNOSIS — Z12.5 SCREENING FOR PROSTATE CANCER: ICD-10-CM

## 2021-12-30 DIAGNOSIS — Z12.12 SCREENING FOR COLORECTAL CANCER: ICD-10-CM

## 2021-12-30 DIAGNOSIS — Z12.11 SCREENING FOR COLORECTAL CANCER: ICD-10-CM

## 2021-12-30 LAB — HEMOCCULT STL QL IA: NEGATIVE

## 2021-12-30 PROCEDURE — 36415 COLL VENOUS BLD VENIPUNCTURE: CPT

## 2021-12-30 PROCEDURE — G0328 FECAL BLOOD SCRN IMMUNOASSAY: HCPCS

## 2022-01-10 DIAGNOSIS — R35.1 BPH ASSOCIATED WITH NOCTURIA: ICD-10-CM

## 2022-01-10 DIAGNOSIS — N40.1 BPH ASSOCIATED WITH NOCTURIA: ICD-10-CM

## 2022-01-10 DIAGNOSIS — I10 ESSENTIAL HYPERTENSION: ICD-10-CM

## 2022-01-10 RX ORDER — TERAZOSIN 2 MG/1
2 CAPSULE ORAL
Qty: 30 CAPSULE | Refills: 1 | Status: SHIPPED | OUTPATIENT
Start: 2022-01-10 | End: 2022-01-18 | Stop reason: SDUPTHER

## 2022-01-10 NOTE — TELEPHONE ENCOUNTER
Requested Prescriptions     Pending Prescriptions Disp Refills    terazosin (HYTRIN) 2 mg capsule 30 capsule 1     Sig: Take 1 capsule (2 mg total) by mouth daily at bedtime

## 2022-01-14 NOTE — PROGRESS NOTES
1/18/2022    [facility]  1950  [id number]      Assessment  -BPH with lower urinary tract symptoms  -Prostate cancer screening  -Erectile dysfunction     Discussion/Plan  Asher Frances is a 73 y o  male who presents in consultation     1  BPH with lower urinary tract symptoms- PVR in the office today is 39 mL  He reports improvement in his urinary pattern since PCP started him on terazosin 2 mg daily  Prescription refill was electronically sent to his pharmacy  Reviewed dietary and behavioral modifications  2  Prostate cancer screening- results of PSA remain pending  We will call once they are finalized  There were no significant findings noted on digital rectal examination today  3  Erectile dysfunction- continue Cialis as needed prior to sexual activity  Call with results of recent PSA  If findings are stable, he will return to the office in 1 year with PSA, LAVINIA, and PVR assessment  Patient was instructed to call sooner with any issues     -All questions answered, patient agrees with plan      History of Present Illness  73 y o  male who presents in consultation today for evaluation of BPH and prostate cancer screening  Patient referred by his PCP  Last PSA from 3/4/2020 was 0 6  He is currently on terazosin 2mg daily and Cialis 5mg as needed prior to sexual activity  Patient reports improvement in his urinary pattern since PCP started him on terazosin  He denies any gross hematuria or dysuria  Patient denies any strong family history of prostate cancer  He denies any prior urologic history, surgical intervention, or instrumentation  Review of Systems  Review of Systems   Constitutional: Negative  HENT: Negative  Respiratory: Negative  Cardiovascular: Negative  Gastrointestinal: Negative  Genitourinary: Negative for decreased urine volume, difficulty urinating, dysuria, flank pain, frequency, hematuria and urgency  Musculoskeletal: Negative  Skin: Negative  Neurological: Negative  Psychiatric/Behavioral: Negative  AUA SYMPTOM SCORE      Most Recent Value   AUA SYMPTOM SCORE    How often have you had a sensation of not emptying your bladder completely after you finished urinating? 1   How often have you had to urinate again less than two hours after you finished urinating? 0   How often have you found you stopped and started again several times when you urinate? 0   How often have you found it difficult to postpone urination? 0   How often have you had a weak urinary stream? 0   How often have you had to push or strain to begin urination? 0   How many times did you most typically get up to urinate from the time you went to bed at night until the time you got up in the morning?  1   Quality of Life: If you were to spend the rest of your life with your urinary condition just the way it is now, how would you feel about that? 2   AUA SYMPTOM SCORE 2          Past Medical History  Past Medical History:   Diagnosis Date    Medical history unknown        Past Social History  Past Surgical History:   Procedure Laterality Date    HERNIA REPAIR      Last Assessed: 12/6/2017       Past Family History  Family History   Problem Relation Age of Onset    Parkinsonism Mother         Symptomatic Parkinson Disease     Coronary artery disease Father     Heart attack Father        Past Social history  Social History     Socioeconomic History    Marital status: Single     Spouse name: Not on file    Number of children: Not on file    Years of education: Not on file    Highest education level: Not on file   Occupational History    Not on file   Tobacco Use    Smoking status: Former Smoker    Smokeless tobacco: Never Used   Vaping Use    Vaping Use: Never used   Substance and Sexual Activity    Alcohol use: Yes     Comment: Social Drinker     Drug use: No    Sexual activity: Not on file   Other Topics Concern    Not on file   Social History Narrative    Not on file Social Determinants of Health     Financial Resource Strain: Low Risk     Difficulty of Paying Living Expenses: Not hard at all   Food Insecurity: No Food Insecurity    Worried About Running Out of Food in the Last Year: Never true    Saw of Food in the Last Year: Never true   Transportation Needs: No Transportation Needs    Lack of Transportation (Medical): No    Lack of Transportation (Non-Medical): No   Physical Activity: Inactive    Days of Exercise per Week: 0 days    Minutes of Exercise per Session: 0 min   Stress: Not on file   Social Connections: Not on file   Intimate Partner Violence: Not on file   Housing Stability: Not on file       Current Medications  Current Outpatient Medications   Medication Sig Dispense Refill    acetaminophen (TYLENOL) 500 mg tablet Take 2 tablets (1,000 mg total) by mouth 2 (two) times a day as needed for mild pain 60 tablet 2    atorvastatin (LIPITOR) 40 mg tablet Take 1 tablet (40 mg total) by mouth daily 90 tablet 3    tadalafil (CIALIS) 5 MG tablet Take 1 tablet (5 mg total) by mouth daily as needed for erectile dysfunction 10 tablet 0    terazosin (HYTRIN) 2 mg capsule Take 1 capsule (2 mg total) by mouth daily at bedtime 30 capsule 11     No current facility-administered medications for this visit  Allergies  No Known Allergies    Past Medical History, Social History, Family History, medications and allergies were reviewed  Vitals  Vitals:    01/18/22 1114   BP: 138/94   Pulse: 104   SpO2: 96%   Weight: 70 3 kg (155 lb)   Height: 5' 7" (1 702 m)       Physical Exam  Physical Exam  Constitutional:       Appearance: Normal appearance  He is well-developed  HENT:      Head: Normocephalic  Eyes:      Pupils: Pupils are equal, round, and reactive to light  Pulmonary:      Effort: Pulmonary effort is normal    Abdominal:      Palpations: Abdomen is soft     Genitourinary:     Prostate: Normal       Rectum: Normal       Comments: Prostate 40 g, smooth, nontender, no nodules  Musculoskeletal:         General: Normal range of motion  Cervical back: Normal range of motion  Skin:     General: Skin is warm and dry  Neurological:      General: No focal deficit present  Mental Status: He is alert and oriented to person, place, and time  Psychiatric:         Mood and Affect: Mood normal          Behavior: Behavior normal          Thought Content:  Thought content normal          Judgment: Judgment normal          Results    I have personally reviewed all pertinent lab results and reviewed with patient  Lab Results   Component Value Date    PSA 0 6 03/04/2020    PSA 0 4 12/11/2018    PSA 0 4 12/27/2017     Lab Results   Component Value Date    CALCIUM 9 1 11/14/2020    K 3 9 11/14/2020    CO2 27 11/14/2020     11/14/2020    BUN 16 11/14/2020    CREATININE 0 64 11/14/2020     Lab Results   Component Value Date    WBC 7 46 11/14/2020    HGB 13 6 11/14/2020    HCT 43 5 11/14/2020    MCV 86 11/14/2020     11/14/2020     Recent Results (from the past 1 hour(s))   POCT Measure PVR    Collection Time: 01/18/22 11:16 AM   Result Value Ref Range    POST-VOID RESIDUAL VOLUME, ML POC 39 mL

## 2022-01-18 ENCOUNTER — OFFICE VISIT (OUTPATIENT)
Dept: UROLOGY | Facility: AMBULATORY SURGERY CENTER | Age: 72
End: 2022-01-18

## 2022-01-18 ENCOUNTER — APPOINTMENT (OUTPATIENT)
Dept: LAB | Facility: CLINIC | Age: 72
End: 2022-01-18

## 2022-01-18 VITALS
DIASTOLIC BLOOD PRESSURE: 94 MMHG | HEART RATE: 104 BPM | BODY MASS INDEX: 24.33 KG/M2 | SYSTOLIC BLOOD PRESSURE: 138 MMHG | OXYGEN SATURATION: 96 % | WEIGHT: 155 LBS | HEIGHT: 67 IN

## 2022-01-18 DIAGNOSIS — R35.1 BPH ASSOCIATED WITH NOCTURIA: ICD-10-CM

## 2022-01-18 DIAGNOSIS — Z12.5 SCREENING FOR PROSTATE CANCER: ICD-10-CM

## 2022-01-18 DIAGNOSIS — N40.1 BENIGN PROSTATIC HYPERPLASIA WITH LOWER URINARY TRACT SYMPTOMS, SYMPTOM DETAILS UNSPECIFIED: Primary | ICD-10-CM

## 2022-01-18 DIAGNOSIS — I10 ESSENTIAL HYPERTENSION: ICD-10-CM

## 2022-01-18 DIAGNOSIS — N40.1 BPH ASSOCIATED WITH NOCTURIA: ICD-10-CM

## 2022-01-18 LAB — POST-VOID RESIDUAL VOLUME, ML POC: 39 ML

## 2022-01-18 PROCEDURE — 36415 COLL VENOUS BLD VENIPUNCTURE: CPT

## 2022-01-18 PROCEDURE — G0103 PSA SCREENING: HCPCS

## 2022-01-18 PROCEDURE — 99213 OFFICE O/P EST LOW 20 MIN: CPT | Performed by: NURSE PRACTITIONER

## 2022-01-18 PROCEDURE — 51798 US URINE CAPACITY MEASURE: CPT | Performed by: NURSE PRACTITIONER

## 2022-01-18 RX ORDER — TERAZOSIN 2 MG/1
2 CAPSULE ORAL
Qty: 30 CAPSULE | Refills: 11 | Status: SHIPPED | OUTPATIENT
Start: 2022-01-18

## 2022-01-19 LAB — PSA SERPL-MCNC: 0.4 NG/ML (ref 0–4)

## 2022-01-20 ENCOUNTER — TELEPHONE (OUTPATIENT)
Dept: INTERNAL MEDICINE CLINIC | Facility: CLINIC | Age: 72
End: 2022-01-20

## 2022-01-20 NOTE — TELEPHONE ENCOUNTER
Patients daughter/Grecia is filling out medical info for patient insurance  Tryng to apply for medcare  She said she left a detailed message to our voicemail  She needs a letter that states her dad has HBP, arthritis and needs medications everyday  Edie Gonzalez will need it emailed to her  [email or web address] when completed  #[phone] [facility]

## 2022-04-05 ENCOUNTER — TELEPHONE (OUTPATIENT)
Dept: INTERNAL MEDICINE CLINIC | Facility: CLINIC | Age: 72
End: 2022-04-05

## 2022-04-05 DIAGNOSIS — R35.0 URINARY FREQUENCY: Primary | ICD-10-CM

## 2022-04-05 NOTE — TELEPHONE ENCOUNTER
PT'S DAUGHTER RUY CALLED CONCERNED ABOUT HER DAD'S BP being uncontrolled and wanting to have him evaluated by Cardiology  Per Kai Gary, she rec'd a call from Mr Elisabeth Weeks because he isnt feeling very well  Pt reports having bodyaches, stuffy nose, some headache and a little dizzy, no fever  Pt reports BP last night was 160/100 and he had been up all night having to urinate  Pt told daughter that he took Nyquil to treat his symptoms  I offered an appt but Kai Gary said he wasn't feeling very well but will speak to Dad about a virtual if he feels its necessary  Upon further discussion, I explained that the medication that the patient is taking for his BP and Urinary symptoms was recently addressed by Urology in January and that she should reach out to them as the medication may need to be adjusted  Clinical staff was unavailable to help triage so I did speak with Dr Elina Palomo about the about the possibility of Nyquil elevating the patient's BP  Per Dr Elina Palomo,  it isn't impossible and recommended that Coricidin (OTC) be taken instead to treat the patient's cold symptoms with a history of HBP  Per Dr Elina Palomo, patient should be evaluated in person within the next month or so to re-evaluate the patient's BP  I relayed this info to Kai Gary and she verbalized understanding  Kai Gary will speak with her Dad to get a better idea of his symptoms, will keep a close eye on his BP, cold symptoms, urinary symptoms, is he drinking extra fluids?, etc  She will call back if he decides to do a Virtual Appt

## 2022-04-05 NOTE — TELEPHONE ENCOUNTER
Patient daughter stated when patient takes terazosin (HYTRIN) 2 mg capsule at nigh he has urinary frequency and cannot sleep because of the side effect  Patient would like to go back to atorvastatin (LIPITOR) 40 mg tablet

## 2022-04-07 ENCOUNTER — TELEMEDICINE (OUTPATIENT)
Dept: INTERNAL MEDICINE CLINIC | Facility: CLINIC | Age: 72
End: 2022-04-07

## 2022-04-07 DIAGNOSIS — U07.1 COVID: Primary | ICD-10-CM

## 2022-04-07 PROCEDURE — 99213 OFFICE O/P EST LOW 20 MIN: CPT | Performed by: INTERNAL MEDICINE

## 2022-04-07 NOTE — PROGRESS NOTES
COVID-19 Outpatient Progress Note  It was my intent to perform this visit via video technology but the patient was not able to do a video connection so the visit was completed via audio telephone only  Assessment/Plan:  Patient  Is a 62-year-old male with a past medical history of BPH, hyperlipidemia presents due to  [address] test   He developed sore throat 4 days ago but today his symptoms are completely resolved  Patient did a home COVID test   Today and result came out positive  He denies fever, headaches, lightheadedness, chest pain, shortness of breath, palpitation, abdominal pain, N/V/C/ D patient has no complaint at this time  He is fully vaccinated  Advised patient to remain on isolation for 5 days from symptom onset and also  Should wear mask for 5 days after isolation  and he agrees, patient is completely asymptomatic and is in his normal state of health  - ED precautions given to the patient  Problem List Items Addressed This Visit     None      Visit Diagnoses     COVID    -  Primary         COVID-19 Plan   Encounter provider Brandi Dong DO    Provider located at [address] [place] [address] Road  [address] [place] [address] 30  [facility] 200  [facility] [id number] [phone]    Recent Visits  Date Type Provider Dept   04/05/22 Telephone [address] recent visits within past 7 days and meeting all other requirements  Today's Visits  Date Type Provider Dept   04/07/22 Telemedicine [place] [address] [facility] today's visits and meeting all other requirements  Future Appointments  No visits were found meeting these conditions    Showing future appointments within next 150 days and meeting all other requirements      Lab Results   Component Value Date    SARSCOV2 NOT DETECTED 07/27/2021     Past Medical History:   Diagnosis Date    Medical history unknown      Past Surgical History:   Procedure Laterality Date    HERNIA REPAIR      Last Assessed: 12/6/2017     Current Outpatient Medications   Medication Sig Dispense Refill    acetaminophen (TYLENOL) 500 mg tablet Take 2 tablets (1,000 mg total) by mouth 2 (two) times a day as needed for mild pain 60 tablet 2    atorvastatin (LIPITOR) 40 mg tablet Take 1 tablet (40 mg total) by mouth daily 90 tablet 3    terazosin (HYTRIN) 2 mg capsule Take 1 capsule (2 mg total) by mouth daily at bedtime 30 capsule 11    tadalafil (CIALIS) 5 MG tablet Take 1 tablet (5 mg total) by mouth daily as needed for erectile dysfunction (Patient not taking: Reported on 4/7/2022 ) 10 tablet 0     No current facility-administered medications for this visit  No Known Allergies    Review of Systems  As mentioned in my HPI above  Objective:    Vitals:     VIRTUAL VISIT DISCLAIMER    Julian Cunningham verbally agrees to participate in [employer]  Pt is aware that [employer] could be limited without vital signs or the ability to perform a full hands-on physical Samira Urias understands he or the provider may request at any time to terminate the video visit and request the patient to seek care or treatment in person

## 2022-04-13 NOTE — TELEPHONE ENCOUNTER
Terazosin can have the side effects the patient is describing of urinary frequency and insomnia  It looks like he was prescribed this medication to help treat two of his problems, hypertension and BPH  However, there are many other alterative medications he can be taking for blood pressure control instead  I would suggest that he stop taking terazosin if he is unable to tolerate the side effects, and make an appointment to see his PCP to get a different blood pressure agent, if it is even necessary

## 2022-04-14 NOTE — TELEPHONE ENCOUNTER
Spoke with Latasha and notified her that it is important for him to continue Terazosin,  If he is having frequency - recommended that he have urine testing completed  Orders have placed  If the testing is negative   Will scheduled a follow up visit to review symptoms and discuss new plan

## 2022-04-14 NOTE — TELEPHONE ENCOUNTER
Please call patient to discuss his lower urinary tract symptoms  Terazosin is typically taken in the evening due to potential side effects of lightheadedness/dizziness  If he is experiencing increased urinary symptoms, recommend he go for urine testing first   Would not discontinue Terazosin at this time  We will call with his results  If urine culture negative, recommend scheduling office visit to re-evaluate and discuss his symptoms

## 2022-04-18 ENCOUNTER — APPOINTMENT (OUTPATIENT)
Dept: LAB | Facility: HOSPITAL | Age: 72
End: 2022-04-18

## 2022-04-18 DIAGNOSIS — R35.0 URINARY FREQUENCY: ICD-10-CM

## 2022-04-18 LAB
BACTERIA UR QL AUTO: NORMAL /HPF
BILIRUB UR QL STRIP: NEGATIVE
CLARITY UR: CLEAR
COLOR UR: COLORLESS
GLUCOSE UR STRIP-MCNC: NEGATIVE MG/DL
HGB UR QL STRIP.AUTO: NEGATIVE
KETONES UR STRIP-MCNC: NEGATIVE MG/DL
LEUKOCYTE ESTERASE UR QL STRIP: NEGATIVE
NITRITE UR QL STRIP: NEGATIVE
NON-SQ EPI CELLS URNS QL MICRO: NORMAL /HPF
PH UR STRIP.AUTO: 5 [PH]
PROT UR STRIP-MCNC: NEGATIVE MG/DL
RBC #/AREA URNS AUTO: NORMAL /HPF
SP GR UR STRIP.AUTO: 1.01 (ref 1–1.03)
UROBILINOGEN UR STRIP-ACNC: <2 MG/DL
WBC #/AREA URNS AUTO: NORMAL /HPF

## 2022-04-18 PROCEDURE — 81001 URINALYSIS AUTO W/SCOPE: CPT

## 2022-04-18 PROCEDURE — 87086 URINE CULTURE/COLONY COUNT: CPT

## 2022-04-19 LAB — BACTERIA UR CULT: NORMAL

## 2022-09-23 ENCOUNTER — OFFICE VISIT (OUTPATIENT)
Dept: INTERNAL MEDICINE CLINIC | Facility: CLINIC | Age: 72
End: 2022-09-23

## 2022-09-23 VITALS
TEMPERATURE: 97.6 F | HEART RATE: 77 BPM | HEIGHT: 66 IN | WEIGHT: 160 LBS | BODY MASS INDEX: 25.71 KG/M2 | SYSTOLIC BLOOD PRESSURE: 147 MMHG | DIASTOLIC BLOOD PRESSURE: 75 MMHG

## 2022-09-23 DIAGNOSIS — R09.81 NASAL CONGESTION: ICD-10-CM

## 2022-09-23 DIAGNOSIS — I10 ESSENTIAL HYPERTENSION: Primary | ICD-10-CM

## 2022-09-23 DIAGNOSIS — N52.9 ERECTILE DYSFUNCTION, UNSPECIFIED ERECTILE DYSFUNCTION TYPE: ICD-10-CM

## 2022-09-23 DIAGNOSIS — Z23 NEED FOR TETANUS BOOSTER: ICD-10-CM

## 2022-09-23 DIAGNOSIS — N40.1 BPH ASSOCIATED WITH NOCTURIA: ICD-10-CM

## 2022-09-23 DIAGNOSIS — R35.1 BPH ASSOCIATED WITH NOCTURIA: ICD-10-CM

## 2022-09-23 DIAGNOSIS — Z23 NEED FOR INFLUENZA VACCINATION: ICD-10-CM

## 2022-09-23 PROCEDURE — 90472 IMMUNIZATION ADMIN EACH ADD: CPT | Performed by: INTERNAL MEDICINE

## 2022-09-23 PROCEDURE — 90471 IMMUNIZATION ADMIN: CPT | Performed by: INTERNAL MEDICINE

## 2022-09-23 PROCEDURE — 90715 TDAP VACCINE 7 YRS/> IM: CPT | Performed by: INTERNAL MEDICINE

## 2022-09-23 PROCEDURE — 90662 IIV NO PRSV INCREASED AG IM: CPT | Performed by: INTERNAL MEDICINE

## 2022-09-23 PROCEDURE — 99213 OFFICE O/P EST LOW 20 MIN: CPT | Performed by: INTERNAL MEDICINE

## 2022-09-23 RX ORDER — CETIRIZINE HYDROCHLORIDE 10 MG/1
10 TABLET ORAL DAILY
Qty: 30 TABLET | Refills: 1 | Status: SHIPPED | OUTPATIENT
Start: 2022-09-23

## 2022-09-23 NOTE — PROGRESS NOTES
ASSESSMENT/PLAN:  Diagnoses and all orders for this visit:    Essential hypertension:  · History of HTN: BP in office today 147/75, repeat 143/88  · Advised lifestyle modifications including diet and exercise  · Encouraged to complete at home BP reading and call office if BP consistently >140/80  · Continue to monitor off anti-hypertensive medications  · If BP elevated at next visit, consider initiation of amlodipine      Benign Prostatic Hyperplasia:  · Well controlled at this time; without urinary symptoms  · Continue on Terazosin 2 mg daily   · Routine follow up with outpatient Urology     Erectile dysfunction:  · Previously initiated on Tadalafil with significant improvement  · Continue on Tadalafil 5 mg daily  · Routine follow up with outpatient Urology     Nasal Congestion:  · Ongoing isolated nasal congestion for the last couple of months  · Physical examination unremarkable  · Consider potential component of seasonal allergies  · Advised to continue to utilize OTC Flonase daily   · Begin Zyrtec 10 mg daily; script sent in to patient's pharmacy     Need for tetanus booster  · TDAP Vaccine administered to patient in office today     Need for influenza vaccination  · Influenza vaccine (Fluzone) administered to patient in office today     BMI Counseling: Body mass index is 25 63 kg/m²  The BMI is above normal  Nutrition recommendations include reducing portion sizes, 3-5 servings of fruits/vegetables daily, decreasing soda and/or juice intake and reducing intake of saturated fat and trans fat  Exercise recommendations include moderate aerobic physical activity for 150 minutes/week, exercising 3-5 times per week and joining a gym  Schedule a follow-up for annual physical examination  CHIEF COMPLAINT: HTN and BPH    HISTORY OF PRESENT ILLNESS:  Mr Maria G Dominguez is a 81year old male with a PMHx of HTN, OA, BPH, and erectile dysfunction who presents to the office today, 9/23/2022, for routine follow up  Today [birth year] patient's birthday and he states that he is doing well overall today - only minimal complaints  Since his last visit, he admits to compliance with Terasozin and has improvement in urinary symptoms  Also has been utilizing Cialsis with success  Only complaint during today's visit is that of nasal congestion  Patient state that he has no accompanying symptoms including headaches, sinus pain/pressure, sore throat, cough or SOB  Symptoms have been ongoing for months and unrelieved with Flonase  Patient does admit to sporadic use of Allegra and denies any history of allergies, including seasonal allergies  The following portions of the patient's history were reviewed and updated as appropriate: allergies, current medications, past family history, past medical history, past social history, past surgical history and problem list     Review of Systems   Constitutional: Negative for activity change, chills, diaphoresis, fatigue and unexpected weight change  HENT: Positive for congestion and rhinorrhea  Negative for postnasal drip, sinus pressure, sinus pain, sore throat and tinnitus  Eyes: Negative for visual disturbance  Respiratory: Negative for cough, chest tightness, shortness of breath and wheezing  Cardiovascular: Negative for chest pain, palpitations and leg swelling  Gastrointestinal: Negative for abdominal distention, constipation, diarrhea, nausea and vomiting  Endocrine: Negative for polydipsia, polyphagia and polyuria  Genitourinary: Negative for difficulty urinating and dysuria  Musculoskeletal: Negative for back pain and gait problem  Skin: Negative for color change and pallor  Neurological: Negative for dizziness, weakness, light-headedness and headaches  Psychiatric/Behavioral: Negative for agitation and confusion         OBJECTIVE:  Vitals:    09/23/22 1001   BP: 147/75   BP Location: Left arm   Patient Position: Sitting   Cuff Size: Standard   Pulse: 77   Temp: 97 6 °F (36 4 °C)   TempSrc: Temporal   Weight: 72 6 kg (160 lb)   Height: 5' 6 25" (1 683 m)     Physical Exam  Constitutional:       General: He is not in acute distress  Appearance: He is normal weight  He is not ill-appearing or toxic-appearing  HENT:      Head: Normocephalic and atraumatic  Nose: Nose normal  No congestion or rhinorrhea  Mouth/Throat:      Mouth: Mucous membranes are moist       Pharynx: Oropharynx is clear  No oropharyngeal exudate  Eyes:      General: No scleral icterus  Cardiovascular:      Rate and Rhythm: Normal rate and regular rhythm  Pulses: Normal pulses  Heart sounds: Normal heart sounds  No murmur heard  Pulmonary:      Effort: Pulmonary effort is normal  No respiratory distress  Breath sounds: Normal breath sounds  No wheezing, rhonchi or rales  Abdominal:      General: Abdomen is flat  Bowel sounds are normal  There is no distension  Palpations: Abdomen is soft  Tenderness: There is no abdominal tenderness  There is no guarding  Hernia: No hernia is present  Musculoskeletal:      Cervical back: Normal range of motion  No rigidity  Right lower leg: No edema  Left lower leg: No edema  Skin:     General: Skin is warm  Capillary Refill: Capillary refill takes less than 2 seconds  Coloration: Skin is not pale  Neurological:      Mental Status: He is alert and oriented to person, place, and time  Motor: No weakness        Gait: Gait normal    Psychiatric:         Mood and Affect: Mood normal          Behavior: Behavior normal            Current Outpatient Medications:     acetaminophen (TYLENOL) 500 mg tablet, Take 2 tablets (1,000 mg total) by mouth 2 (two) times a day as needed for mild pain, Disp: 60 tablet, Rfl: 2    tadalafil (CIALIS) 5 MG tablet, Take 1 tablet (5 mg total) by mouth daily as needed for erectile dysfunction, Disp: 10 tablet, Rfl: 0    terazosin (HYTRIN) 2 mg capsule, Take 1 capsule (2 mg total) by mouth daily at bedtime, Disp: 30 capsule, Rfl: 11    atorvastatin (LIPITOR) 40 mg tablet, Take 1 tablet (40 mg total) by mouth daily (Patient not taking: Reported on 9/23/2022), Disp: 90 tablet, Rfl: 3    Past Medical History:   Diagnosis Date    BPH (benign prostatic hyperplasia)     Erectile dysfunction     Hypertension     Medical history unknown      Past Surgical History:   Procedure Laterality Date    HERNIA REPAIR      Last Assessed: 12/6/2017     Social History     Socioeconomic History    Marital status: Single     Spouse name: Not on file    Number of children: Not on file    Years of education: Not on file    Highest education level: Not on file   Occupational History    Not on file   Tobacco Use    Smoking status: Former Smoker    Smokeless tobacco: Never Used   Vaping Use    Vaping Use: Never used   Substance and Sexual Activity    Alcohol use: Yes     Comment: Social Drinker     Drug use: No    Sexual activity: Not on file   Other Topics Concern    Not on file   Social History Narrative    Not on file     Social Determinants of Health     Financial Resource Strain: Low Risk     Difficulty of Paying Living Expenses: Not hard at all   Food Insecurity: No Food Insecurity    Worried About Running Out of Food in the Last Year: Never true    Saw of Food in the Last Year: Never true   Transportation Needs: No Transportation Needs    Lack of Transportation (Medical): No    Lack of Transportation (Non-Medical):  No   Physical Activity: Not on file   Stress: Not on file   Social Connections: Not on file   Intimate Partner Violence: Not on file   Housing Stability: Low Risk     Unable to Pay for Housing in the Last Year: No    Number of Places Lived in the Last Year: 1    Unstable Housing in the Last Year: No     Family History   Problem Relation Age of Onset    Parkinsonism Mother         Symptomatic Parkinson Disease     Coronary artery disease Father     Heart attack Father        ==  Juju BautistaDO Navya carrion [address] Internal Medicine PGY-3    [address] [address] [address]   [facility] , [address] [address] [facility] [address] [facility]  Office: [phone]  Fax: [phone]

## 2022-10-10 ENCOUNTER — TELEPHONE (OUTPATIENT)
Dept: INTERNAL MEDICINE CLINIC | Facility: CLINIC | Age: 72
End: 2022-10-10

## 2022-10-10 NOTE — TELEPHONE ENCOUNTER
Kallie/daughter (signed consent on file) called concerned because her Dad/Jimbo just told her he has been out of his blood pressure medications for the past 3 months  She stated yesterday he was dizzy and experiencing headaches so they checked his blood pressure and it was 160/100  She said he is feeling better today but needs a refill of his medication  They did not know the name of what he is supposed to be taking  I did offer an appointment for him for 10/11/22 but he can't make this appointment due to work  He is only off on Friday  Please advise and refill medication if possible  Pharmacy was confirmed

## 2022-10-10 NOTE — TELEPHONE ENCOUNTER
Please review and advise  Patient was seen by Dr Ewa Seaman pn 9/23 and she mentioned possible start on amlodipine   Please advise

## 2022-10-13 ENCOUNTER — TELEPHONE (OUTPATIENT)
Dept: INTERNAL MEDICINE CLINIC | Facility: CLINIC | Age: 72
End: 2022-10-13

## 2022-10-13 DIAGNOSIS — I10 ESSENTIAL HYPERTENSION: Primary | ICD-10-CM

## 2022-10-13 RX ORDER — AMLODIPINE BESYLATE 5 MG/1
5 TABLET ORAL DAILY
Qty: 90 TABLET | Refills: 0 | Status: SHIPPED | OUTPATIENT
Start: 2022-10-13

## 2022-10-13 NOTE — TELEPHONE ENCOUNTER
Patients daughter April Vázquez made aware that Amlodipine was sent to the pharmacy and new appt was made

## 2022-10-13 NOTE — TELEPHONE ENCOUNTER
Patient's daughter Anette Horton called in regards to the patients blood pressure medications  She stated that her dad no longer has the bottle to that medication so she was unaware of the name  She only knows that he takes it for his blood pressure  Anette Sarmiento wanted me to go through the patients medications with her to see if I could find out  which one she was talking about  She stated that her dad was in need of the medicine and she wanted to see if it can be refilled  I advised the patient that I was not able to put in a refill request with out knowing the correct name of the medication  Directed Anette Sarmiento to the pharmacy to see if they can giver her the name  Advised her to give us a call back once she receives the information

## 2022-10-13 NOTE — TELEPHONE ENCOUNTER
Script sent for amlodipine 5mg  He will need a follow-up appointment in 2-4 weeks to see how his blood pressure is and how he's handling the new medication

## 2022-11-04 ENCOUNTER — OFFICE VISIT (OUTPATIENT)
Dept: INTERNAL MEDICINE CLINIC | Facility: CLINIC | Age: 72
End: 2022-11-04

## 2022-11-04 VITALS
DIASTOLIC BLOOD PRESSURE: 88 MMHG | TEMPERATURE: 98.5 F | WEIGHT: 162 LBS | HEIGHT: 66 IN | SYSTOLIC BLOOD PRESSURE: 141 MMHG | BODY MASS INDEX: 26.03 KG/M2 | HEART RATE: 79 BPM | OXYGEN SATURATION: 98 %

## 2022-11-04 DIAGNOSIS — I10 ESSENTIAL HYPERTENSION: Primary | ICD-10-CM

## 2022-11-04 DIAGNOSIS — Z00.00 HEALTHCARE MAINTENANCE: ICD-10-CM

## 2022-11-04 DIAGNOSIS — H61.21 IMPACTED CERUMEN OF RIGHT EAR: ICD-10-CM

## 2022-11-04 RX ORDER — NAPROXEN 500 MG/1
TABLET ORAL
COMMUNITY
Start: 2022-11-03

## 2022-11-04 RX ORDER — CYCLOBENZAPRINE HCL 5 MG
5-10 TABLET ORAL DAILY PRN
COMMUNITY
Start: 2022-11-03 | End: 2022-11-13

## 2022-11-04 RX ORDER — AMLODIPINE BESYLATE 5 MG/1
10 TABLET ORAL DAILY
Qty: 90 TABLET | Refills: 0
Start: 2022-11-04

## 2022-11-04 NOTE — PATIENT INSTRUCTIONS
-Increase Amlodipine to 10 mg (2 tablets) daily   -Monitor blood pressure at home and call office in 1 week to 10 days with reading  -Please call office if experiencing dizziness  -Complete routine lab work, complete before follow-up appointment   -Return in 3 months for annual physical

## 2022-11-04 NOTE — PROGRESS NOTES
[facility] Visit Note  Evelia Wiley 81 y o  male   MRN: [id number]    Assessment and Plan      1  Essential hypertension:  - History of HTN with BP in office today 141/88  - Patient started on Amlodipine 5 mg daily on 10/13; tolerating well with no side effects  - Increase amLODIPine (NORVASC) 5 mg tablet; Take 2 tablets (10 mg total) by mouth daily  - Can send new prescription for 10 mg tablets at next appointment, if needed  - Patient has not been monitoring BP at home since last visit  - Encouraged to monitor blood pressure at home (at least 3 x weekly) and call office in 7-10 days with readings  - Recommended continued lifestyle modifications such as diet and exercise  - Patient advised to call office if he experiences fatigue, dizziness, low blood pressure readings, headaches, swelling, or GI upset after starting increased dose    2  Healthcare maintenance:  - Due for annual physical  - Recommend follow-up appointment in 3 months for annual, complete lab work prior to visit  - CBC and differential; Future  - Comprehensive metabolic panel; Future    3  Impacted cerumen of right ear:    Ear cerumen removal    Date/Time: 11/4/2022 10:30 AM  Performed by: Rolando Penn MD  Authorized by: Rolando Penn MD   Universal Protocol:  Procedure performed by: (Dr Aashish Villagomez)  Consent: Verbal consent obtained  Written consent not obtained    Risks and benefits: risks, benefits and alternatives were discussed  Consent given by: patient  Timeout called at: 11/4/2022 10:30 AM   Patient understanding: patient states understanding of the procedure being performed  Patient consent: the patient's understanding of the procedure matches consent given  Procedure consent: procedure consent matches procedure scheduled  Patient identity confirmed: verbally with patient      Patient location:  Clinic  Indications / Diagnosis:  Impacted cerumen of right ear  Procedure details:     Local anesthetic:  None    Location:  R ear    Procedure type: irrigation with instrumentation      Instrumentation: curette      Approach:  External  Post-procedure details:     Complication:  None    Hearing quality:  Improved    Patient tolerance of procedure: Tolerated well, no immediate complications      Follow up in our clinic in 3 month(s) for evaluate your response to treatment and annual physical   Subjective     HISTORY OF PRESENT ILLNESS:    Janet Davis is a 81 y o  male with a past medical history of HTN, BPH, and erectile dysfuction who presents to clinic today for a blood pressure follow-up  Patient was last seen in the clinic on 9/23/22 with an elevated blood pressure of 147/75  He was recommended lifestyle changes and at- home blood pressure monitoring and was to be followed off medication  On 10/10, patient's daughter called as patient was having headaches, dizziness, and his blood pressure was 160/100  At that time, he was started on Amlodipine 5 mg and presents for evaluation and follow-up  Patient states he has been taking his medication with no complications  Denies N/V/D, dizziness, headaches, fatigue, and swelling  He has not been monitoring his blood pressure at home  In addition, patient complained of decreased hearing in his right ear due to wax build-up for past month  Review of Systems   Constitutional: Negative for appetite change, chills, diaphoresis and fever  HENT: Positive for hearing loss  Negative for congestion, postnasal drip, sinus pressure, sinus pain, sneezing, sore throat and tinnitus  Eyes: Negative for visual disturbance  Respiratory: Negative for cough, chest tightness, shortness of breath and wheezing  Cardiovascular: Negative for chest pain, palpitations and leg swelling  Gastrointestinal: Negative for abdominal pain, constipation, diarrhea, nausea and vomiting  Endocrine: Negative  Genitourinary: Negative  Musculoskeletal: Negative for arthralgias  Neurological: Negative for dizziness, tremors, weakness and headaches  Psychiatric/Behavioral: Negative  All other systems reviewed and are negative  Objective     Vitals:    11/04/22 0951   BP: 141/88   BP Location: Right arm   Patient Position: Sitting   Cuff Size: Large   Pulse: 79   Temp: 98 5 °F (36 9 °C)   TempSrc: Temporal   SpO2: 98%   Weight: 73 5 kg (162 lb)   Height: 5' 6" (1 676 m)     Physical Exam  Vitals and nursing note reviewed  Constitutional:       General: He is not in acute distress  Appearance: Normal appearance  He is normal weight  He is not ill-appearing or diaphoretic  HENT:      Head: Normocephalic and atraumatic  Right Ear: Decreased hearing noted  There is impacted cerumen  Nose: Nose normal  No congestion  Mouth/Throat:      Mouth: Mucous membranes are moist       Pharynx: Oropharynx is clear  No oropharyngeal exudate  Eyes:      General: No scleral icterus  Conjunctiva/sclera: Conjunctivae normal       Pupils: Pupils are equal, round, and reactive to light  Cardiovascular:      Rate and Rhythm: Normal rate and regular rhythm  Pulses: Normal pulses  Heart sounds: Normal heart sounds  No murmur heard  Pulmonary:      Effort: Pulmonary effort is normal  No respiratory distress  Breath sounds: Normal breath sounds  No wheezing  Chest:      Chest wall: No tenderness  Abdominal:      General: Abdomen is flat  Bowel sounds are normal       Palpations: Abdomen is soft  Tenderness: There is no abdominal tenderness  Musculoskeletal:         General: No swelling  Normal range of motion  Cervical back: Normal range of motion  Right lower leg: No edema  Left lower leg: No edema  Skin:     General: Skin is warm  Capillary Refill: Capillary refill takes less than 2 seconds  Coloration: Skin is not jaundiced  Neurological:      General: No focal deficit present  Mental Status: He is alert and oriented to person, place, and time  Motor: No weakness  Psychiatric:         Mood and Affect: Mood normal          Behavior: Behavior normal          Thought Content:  Thought content normal          Judgment: Judgment normal        History     Current Outpatient Medications:   •  acetaminophen (TYLENOL) 500 mg tablet, Take 2 tablets (1,000 mg total) by mouth 2 (two) times a day as needed for mild pain, Disp: 60 tablet, Rfl: 2  •  amLODIPine (NORVASC) 5 mg tablet, Take 2 tablets (10 mg total) by mouth daily, Disp: 90 tablet, Rfl: 0  •  atorvastatin (LIPITOR) 40 mg tablet, Take 1 tablet (40 mg total) by mouth daily, Disp: 90 tablet, Rfl: 3  •  cyclobenzaprine (FLEXERIL) 5 mg tablet, Take 5-10 mg by mouth daily as needed, Disp: , Rfl:   •  naproxen (NAPROSYN) 500 mg tablet, TAKE 1 TABLET BY MOUTH TWICE DAILY WITH MEALS FOR 20 DAYS, Disp: , Rfl:   •  tadalafil (CIALIS) 5 MG tablet, TAKE 1 TABLET BY MOUTH ONCE DAILY AS NEEDED FOR ERECTILE DYSFUNCTION, Disp: 10 tablet, Rfl: 3  •  terazosin (HYTRIN) 2 mg capsule, Take 1 capsule (2 mg total) by mouth daily at bedtime, Disp: 30 capsule, Rfl: 11  •  cetirizine (ZyrTEC) 10 mg tablet, Take 1 tablet (10 mg total) by mouth daily (Patient not taking: Reported on 11/4/2022), Disp: 30 tablet, Rfl: 1  No Known Allergies   Past Medical History:   Diagnosis Date   • BPH (benign prostatic hyperplasia)    • Erectile dysfunction    • Hypertension    • Medical history unknown      Past Surgical History:   Procedure Laterality Date   • HERNIA REPAIR      Last Assessed: 12/6/2017     Social History     Socioeconomic History   • Marital status: Single     Spouse name: Not on file   • Number of children: Not on file   • Years of education: Not on file   • Highest education level: Not on file   Occupational History   • Not on file   Tobacco Use   • Smoking status: Former Smoker   • Smokeless tobacco: Never Used   Vaping Use   • Vaping Use: Never used Substance and Sexual Activity   • Alcohol use: Yes     Comment: Social Drinker    • Drug use: No   • Sexual activity: Not on file   Other Topics Concern   • Not on file   Social History Narrative   • Not on file     Social Determinants of Health     Financial Resource Strain: Low Risk    • Difficulty of Paying Living Expenses: Not hard at all   Food Insecurity: No Food Insecurity   • Worried About Running Out of Food in the Last Year: Never true   • Ran Out of Food in the Last Year: Never true   Transportation Needs: No Transportation Needs   • Lack of Transportation (Medical): No   • Lack of Transportation (Non-Medical): No   Physical Activity: Not on file   Stress: Not on file   Social Connections: Not on file   Intimate Partner Violence: Not on file   Housing Stability: Low Risk    • Unable to Pay for Housing in the Last Year: No   • Number of Places Lived in the Last Year: 1   • Unstable Housing in the Last Year: No     Family History   Problem Relation Age of Onset   • Parkinsonism Mother         Symptomatic Parkinson Disease    • Coronary artery disease Father    • Heart attack Father      Freya Martinez MD, MPH  [facility] PGY-1  [address] [facility]  [address] [facility], [address] [facility]    PLEASE NOTE:  This encounter was completed utilizing the [employer] Direct Speech Voice Recognition Software  Grammatical errors, random word insertions, pronoun errors and incomplete sentences are occasional consequences of the system due to software limitations, ambient noise and hardware issues  These may be missed by proof reading prior to affixing electronic signature  Any questions or concerns about the content, text or information contained within the body of this dictation should be directly addressed to the physician for clarification  Please do not hesitate to call me directly if you have any any questions or concerns

## 2022-11-11 ENCOUNTER — APPOINTMENT (OUTPATIENT)
Dept: LAB | Facility: CLINIC | Age: 72
End: 2022-11-11

## 2022-11-11 DIAGNOSIS — Z00.00 HEALTHCARE MAINTENANCE: ICD-10-CM

## 2022-11-11 LAB
ALBUMIN SERPL BCP-MCNC: 4.1 G/DL (ref 3.5–5)
ALP SERPL-CCNC: 32 U/L (ref 46–116)
ALT SERPL W P-5'-P-CCNC: <60 U/L (ref 12–78)
ANION GAP SERPL CALCULATED.3IONS-SCNC: 6 MMOL/L (ref 4–13)
AST SERPL W P-5'-P-CCNC: 31 U/L (ref 5–45)
BASOPHILS # BLD AUTO: 0.07 THOUSANDS/ÂΜL (ref 0–0.1)
BASOPHILS NFR BLD AUTO: 1 % (ref 0–1)
BILIRUB SERPL-MCNC: 0.68 MG/DL (ref 0.2–1)
BUN SERPL-MCNC: 16 MG/DL (ref 5–25)
CALCIUM SERPL-MCNC: 10 MG/DL (ref 8.3–10.1)
CHLORIDE SERPL-SCNC: 106 MMOL/L (ref 96–108)
CO2 SERPL-SCNC: 25 MMOL/L (ref 21–32)
CREAT SERPL-MCNC: 0.75 MG/DL (ref 0.6–1.3)
EOSINOPHIL # BLD AUTO: 0.23 THOUSAND/ÂΜL (ref 0–0.61)
EOSINOPHIL NFR BLD AUTO: 3 % (ref 0–6)
ERYTHROCYTE [DISTWIDTH] IN BLOOD BY AUTOMATED COUNT: 13.4 % (ref 11.6–15.1)
GFR SERPL CREATININE-BSD FRML MDRD: 92 ML/MIN/1.73SQ M
GLUCOSE SERPL-MCNC: 104 MG/DL (ref 65–140)
HCT VFR BLD AUTO: 44.8 % (ref 36.5–49.3)
HGB BLD-MCNC: 14.3 G/DL (ref 12–17)
IMM GRANULOCYTES # BLD AUTO: 0.04 THOUSAND/UL (ref 0–0.2)
IMM GRANULOCYTES NFR BLD AUTO: 0 % (ref 0–2)
LYMPHOCYTES # BLD AUTO: 2.85 THOUSANDS/ÂΜL (ref 0.6–4.47)
LYMPHOCYTES NFR BLD AUTO: 32 % (ref 14–44)
MCH RBC QN AUTO: 27.5 PG (ref 26.8–34.3)
MCHC RBC AUTO-ENTMCNC: 31.9 G/DL (ref 31.4–37.4)
MCV RBC AUTO: 86 FL (ref 82–98)
MONOCYTES # BLD AUTO: 0.82 THOUSAND/ÂΜL (ref 0.17–1.22)
MONOCYTES NFR BLD AUTO: 9 % (ref 4–12)
NEUTROPHILS # BLD AUTO: 5.05 THOUSANDS/ÂΜL (ref 1.85–7.62)
NEUTS SEG NFR BLD AUTO: 55 % (ref 43–75)
NRBC BLD AUTO-RTO: 0 /100 WBCS
PLATELET # BLD AUTO: 346 THOUSANDS/UL (ref 149–390)
PMV BLD AUTO: 10.4 FL (ref 8.9–12.7)
POTASSIUM SERPL-SCNC: 4.3 MMOL/L (ref 3.5–5.3)
PROT SERPL-MCNC: 7.7 G/DL (ref 6.4–8.4)
RBC # BLD AUTO: 5.2 MILLION/UL (ref 3.88–5.62)
SODIUM SERPL-SCNC: 137 MMOL/L (ref 135–147)
WBC # BLD AUTO: 9.06 THOUSAND/UL (ref 4.31–10.16)

## 2023-01-19 DIAGNOSIS — N40.1 BPH ASSOCIATED WITH NOCTURIA: ICD-10-CM

## 2023-01-19 DIAGNOSIS — R35.1 BPH ASSOCIATED WITH NOCTURIA: ICD-10-CM

## 2023-01-19 DIAGNOSIS — I10 ESSENTIAL HYPERTENSION: ICD-10-CM

## 2023-01-19 RX ORDER — TERAZOSIN 2 MG/1
CAPSULE ORAL
Qty: 30 CAPSULE | Refills: 0 | Status: SHIPPED | OUTPATIENT
Start: 2023-01-19 | End: 2023-01-27 | Stop reason: SDUPTHER

## 2023-01-20 ENCOUNTER — OFFICE VISIT (OUTPATIENT)
Dept: INTERNAL MEDICINE CLINIC | Facility: CLINIC | Age: 73
End: 2023-01-20

## 2023-01-20 VITALS
WEIGHT: 156.8 LBS | HEART RATE: 80 BPM | OXYGEN SATURATION: 97 % | BODY MASS INDEX: 26.12 KG/M2 | DIASTOLIC BLOOD PRESSURE: 77 MMHG | TEMPERATURE: 98.3 F | SYSTOLIC BLOOD PRESSURE: 148 MMHG | HEIGHT: 65 IN

## 2023-01-20 DIAGNOSIS — N52.9 ERECTILE DYSFUNCTION, UNSPECIFIED ERECTILE DYSFUNCTION TYPE: ICD-10-CM

## 2023-01-20 DIAGNOSIS — Z12.12 SCREENING FOR COLORECTAL CANCER: ICD-10-CM

## 2023-01-20 DIAGNOSIS — I10 ESSENTIAL HYPERTENSION: ICD-10-CM

## 2023-01-20 DIAGNOSIS — Z13.6 ENCOUNTER FOR LIPID SCREENING FOR CARDIOVASCULAR DISEASE: ICD-10-CM

## 2023-01-20 DIAGNOSIS — Z00.00 ANNUAL PHYSICAL EXAM: Primary | ICD-10-CM

## 2023-01-20 DIAGNOSIS — Z97.3 WEARS GLASSES: ICD-10-CM

## 2023-01-20 DIAGNOSIS — R35.1 BPH ASSOCIATED WITH NOCTURIA: ICD-10-CM

## 2023-01-20 DIAGNOSIS — N40.1 BPH ASSOCIATED WITH NOCTURIA: ICD-10-CM

## 2023-01-20 DIAGNOSIS — M25.561 CHRONIC PAIN OF RIGHT KNEE: ICD-10-CM

## 2023-01-20 DIAGNOSIS — Z13.220 ENCOUNTER FOR LIPID SCREENING FOR CARDIOVASCULAR DISEASE: ICD-10-CM

## 2023-01-20 DIAGNOSIS — E78.5 HYPERLIPIDEMIA: ICD-10-CM

## 2023-01-20 DIAGNOSIS — Z12.11 SCREENING FOR COLORECTAL CANCER: ICD-10-CM

## 2023-01-20 DIAGNOSIS — R19.6 HALITOSIS: ICD-10-CM

## 2023-01-20 DIAGNOSIS — G89.29 CHRONIC PAIN OF RIGHT KNEE: ICD-10-CM

## 2023-01-20 DIAGNOSIS — Z13.1 SCREENING FOR DIABETES MELLITUS (DM): ICD-10-CM

## 2023-01-20 RX ORDER — AMLODIPINE BESYLATE 5 MG/1
10 TABLET ORAL DAILY
Qty: 180 TABLET | Refills: 3 | Status: SHIPPED | OUTPATIENT
Start: 2023-01-20 | End: 2023-01-23

## 2023-01-20 RX ORDER — ATORVASTATIN CALCIUM 40 MG/1
40 TABLET, FILM COATED ORAL DAILY
Qty: 90 TABLET | Refills: 3 | Status: SHIPPED | OUTPATIENT
Start: 2023-01-20

## 2023-01-20 NOTE — PATIENT INSTRUCTIONS
Wellness Visit for Adults   AMBULATORY CARE:   A wellness visit  is when you see your healthcare provider to get screened for health problems  Your healthcare provider will also give you advice on how to stay healthy  Write down your questions so you remember to ask them  Ask your healthcare provider how often you should have a wellness visit  What happens at a wellness visit:  Your healthcare provider will ask about your health, and your family history of health problems  This includes high blood pressure, heart disease, and cancer  He or she will ask if you have symptoms that concern you, if you smoke, and about your mood  You may also be asked about your intake of medicines, supplements, food, and alcohol  Any of the following may be done:  · Your weight  will be checked  Your height may also be checked so your body mass index (BMI) can be calculated  Your BMI shows if you are at a healthy weight  · Your blood pressure  and heart rate will be checked  Your temperature may also be checked  · Blood and urine tests  may be done  Blood tests may be done to check your cholesterol levels  Abnormal cholesterol levels increase your risk for heart disease and stroke  You may also need a blood or urine test to check for diabetes if you are at increased risk  Urine tests may be done to look for signs of an infection or kidney disease  · A physical exam  includes checking your heartbeat and lungs with a stethoscope  Your healthcare provider may also check your skin to look for sun damage  · Screening tests  may be recommended  A screening test is done to check for diseases that may not cause symptoms  The screening tests you may need depend on your age, gender, family history, and lifestyle habits  For example, colorectal screening may be recommended if you are 48years old or older  Screening tests you need if you are a woman:   · A Pap smear  is used to screen for cervical cancer   Pap smears are usually done every 3 to 5 years depending on your age  You may need them more often if you have had abnormal Pap smear test results in the past  Ask your healthcare provider how often you should have a Pap smear  · A mammogram  is an x-ray of your breasts to screen for breast cancer  Experts recommend mammograms every 2 years starting at age 48 years  You may need a mammogram at age 52 years or younger if you have an increased risk for breast cancer  Talk to your healthcare provider about when you should start having mammograms and how often you need them  Vaccines you may need:   · Get an influenza vaccine  every year  The influenza vaccine protects you from the flu  Several types of viruses cause the flu  The viruses change over time, so new vaccines are made each year  · Get a tetanus-diphtheria (Td) booster vaccine  every 10 years  This vaccine protects you against tetanus and diphtheria  Tetanus is a severe infection that may cause painful muscle spasms and lockjaw  Diphtheria is a severe bacterial infection that causes a thick covering in the back of your mouth and throat  · Get a human papillomavirus (HPV) vaccine  if you are female and aged 23 to 32 or male 23 to 24 and never received it  This vaccine protects you from HPV infection  HPV is the most common infection spread by sexual contact  HPV may also cause vaginal, penile, and anal cancers  · Get a pneumococcal vaccine  if you are aged 72 years or older  The pneumococcal vaccine is an injection given to protect you from pneumococcal disease  Pneumococcal disease is an infection caused by pneumococcal bacteria  The infection may cause pneumonia, meningitis, or an ear infection  · Get a shingles vaccine  if you are 60 or older, even if you have had shingles before  The shingles vaccine is an injection to protect you from the varicella-zoster virus  This is the same virus that causes chickenpox   Shingles is a painful rash that develops in people who had chickenpox or have been exposed to the virus  How to eat healthy:  My Plate is a model for planning healthy meals  It shows the types and amounts of foods that should go on your plate  Fruits and vegetables make up about half of your plate, and grains and protein make up the other half  A serving of dairy is included on the side of your plate  The amount of calories and serving sizes you need depends on your age, gender, weight, and height  Examples of healthy foods are listed below:  · Eat a variety of vegetables  such as dark green, red, and orange vegetables  You can also include canned vegetables low in sodium (salt) and frozen vegetables without added butter or sauces  · Eat a variety of fresh fruits , canned fruit in 100% juice, frozen fruit, and dried fruit  · Include whole grains  At least half of the grains you eat should be whole grains  Examples include whole-wheat bread, wheat pasta, brown rice, and whole-grain cereals such as oatmeal     · Eat a variety of protein foods such as seafood (fish and shellfish), lean meat, and poultry without skin (turkey and chicken)  Examples of lean meats include pork leg, shoulder, or tenderloin, and beef round, sirloin, tenderloin, and extra lean ground beef  Other protein foods include eggs and egg substitutes, beans, peas, soy products, nuts, and seeds  · Choose low-fat dairy products such as skim or 1% milk or low-fat yogurt, cheese, and cottage cheese  · Limit unhealthy fats  such as butter, hard margarine, and shortening  Exercise:  Exercise at least 30 minutes per day on most days of the week  Some examples of exercise include walking, biking, dancing, and swimming  You can also fit in more physical activity by taking the stairs instead of the elevator or parking farther away from stores  Include muscle strengthening activities 2 days each week  Regular exercise provides many health benefits   It helps you manage your weight, and decreases your risk for type 2 diabetes, heart disease, stroke, and high blood pressure  Exercise can also help improve your mood  Ask your healthcare provider about the best exercise plan for you  General health and safety guidelines:   · Do not smoke  Nicotine and other chemicals in cigarettes and cigars can cause lung damage  Ask your healthcare provider for information if you currently smoke and need help to quit  E-cigarettes or smokeless tobacco still contain nicotine  Talk to your healthcare provider before you use these products  · Limit alcohol  A drink of alcohol is 12 ounces of beer, 5 ounces of wine, or 1½ ounces of liquor  · Lose weight, if needed  Being overweight increases your risk of certain health conditions  These include heart disease, high blood pressure, type 2 diabetes, and certain types of cancer  · Protect your skin  Do not sunbathe or use tanning beds  Use sunscreen with a SPF 15 or higher  Apply sunscreen at least 15 minutes before you go outside  Reapply sunscreen every 2 hours  Wear protective clothing, hats, and sunglasses when you are outside  · Drive safely  Always wear your seatbelt  Make sure everyone in your car wears a seatbelt  A seatbelt can save your life if you are in an accident  Do not use your cell phone when you are driving  This could distract you and cause an accident  Pull over if you need to make a call or send a text message  · Practice safe sex  Use latex condoms if are sexually active and have more than one partner  Your healthcare provider may recommend screening tests for sexually transmitted infections (STIs)  · Wear helmets, lifejackets, and protective gear  Always wear a helmet when you ride a bike or motorcycle, go skiing, or play sports that could cause a head injury  Wear protective equipment when you play sports  Wear a lifejacket when you are on a boat or doing water sports      © Copyright [employer] 2022 Information is for End User's use only and may not be sold, redistributed or otherwise used for commercial purposes  All illustrations and images included in CareNotes® are the copyrighted property of [employer]  or [employer]aire [employer]  The above information is an  only  It is not intended as medical advice for individual conditions or treatments  Talk to your doctor, nurse or pharmacist before following any medical regimen to see if it is safe and effective for you  Cholesterol and Your Health   AMBULATORY CARE:   Cholesterol  is a waxy, fat-like substance  Your body uses cholesterol to make hormones and new cells, and to protect nerves  Cholesterol is made by your body  It also comes from certain foods you eat, such as meat and dairy products  Your healthcare provider can help you set goals for your cholesterol levels  He or she can help you create a plan to meet your goals  Cholesterol level goals: Your cholesterol level goals depend on your risk for heart disease, your age, and your other health conditions  The following are general guidelines:  · Total cholesterol  includes low-density lipoprotein (LDL), high-density lipoprotein (HDL), and triglyceride levels  The total cholesterol level should be lower than 200 mg/dL and is best at about 150 mg/dL  · LDL cholesterol  is called bad cholesterol  because it forms plaque in your arteries  As plaque builds up, your arteries become narrow, and less blood flows through  When plaque decreases blood flow to your heart, you may have chest pain  If plaque completely blocks an artery that brings blood to your heart, you may have a heart attack  Plaque can break off and form blood clots  Blood clots may block arteries in your brain and cause a stroke  The level should be less than 130 mg/dL and is best at about 100 mg/dL  · HDL cholesterol  is called good cholesterol  because it helps remove LDL cholesterol from your arteries   It does this by attaching to LDL cholesterol and carrying it to your liver  Your liver breaks down LDL cholesterol so your body can get rid of it  High levels of HDL cholesterol can help prevent a heart attack and stroke  Low levels of HDL cholesterol can increase your risk for heart disease, heart attack, and stroke  The level should be 60 mg/dL or higher  · Triglycerides  are a type of fat that store energy from foods you eat  High levels of triglycerides also cause plaque buildup  This can increase your risk for a heart attack or stroke  If your triglyceride level is high, your LDL cholesterol level may also be high  The level should be less than 150 mg/dL  Any of the following can increase your risk for high cholesterol:   · Smoking cigarettes    · Being overweight or obese, or not getting enough exercise    · Drinking large amounts of alcohol    · A medical condition such as hypertension (high blood pressure) or diabetes    · Certain genes passed from your parents to you    · Age older than 65 years    What you need to know about having your cholesterol levels checked: Adults 21to 39years of age should have their cholesterol levels checked every 4 to 6 years  Adults 45 years or older should have their cholesterol checked every 1 to 2 years  You may need your cholesterol checked more often, or at a younger age, if you have risk factors for heart disease  You may also need to have your cholesterol checked more often if you have other health conditions, such as diabetes  Blood tests are used to check cholesterol levels  Blood tests measure your levels of triglycerides, LDL cholesterol, and HDL cholesterol  How healthy fats affect your cholesterol levels:  Healthy fats, also called unsaturated fats, help lower LDL cholesterol and triglyceride levels  Healthy fats include the following:  · Monounsaturated fats  are found in foods such as olive oil, canola oil, avocado, nuts, and olives      · Polyunsaturated fats,  such as omega 3 fats, are found in fish, such as salmon, trout, and tuna  They can also be found in plant foods such as flaxseed, walnuts, and soybeans  How unhealthy fats affect your cholesterol levels:  Unhealthy fats increase LDL cholesterol and triglyceride levels  They are found in foods high in cholesterol, saturated fat, and trans fat:  · Cholesterol  is found in eggs, dairy, and meat  · Saturated fat  is found in butter, cheese, ice cream, whole milk, and coconut oil  Saturated fat is also found in meat, such as sausage, hot dogs, and bologna  · Trans fat  is found in liquid oils and is used in fried and baked foods  Foods that contain trans fats include chips, crackers, muffins, sweet rolls, microwave popcorn, and cookies  Treatment  for high cholesterol will also decrease your risk of heart disease, heart attack, and stroke  Treatment may include any of the following:  · Lifestyle changes  may include food, exercise, weight loss, and quitting smoking  You may also need to decrease the amount of alcohol you drink  Your healthcare provider will want you to start with lifestyle changes  Other treatment may be added if lifestyle changes are not enough  Your healthcare provider may recommend you work with a team to manage hyperlipidemia  The team may include medical experts such as a dietitian, an exercise or physical therapist, and a behavior therapist  Your family members may be included in helping you create lifestyle changes  · Medicines  may be given to lower your LDL cholesterol, triglyceride levels, or total cholesterol level  You may need medicines to lower your cholesterol if any of the following is true:    ? You have a history of stroke, TIA, unstable angina, or a heart attack  ? Your LDL cholesterol level is 190 mg/dL or higher  ? You are age 36 to 76 years, have diabetes or heart disease risk factors, and your LDL cholesterol is 70 mg/dL or higher      · Supplements  include fish oil, red yeast rice, and garlic  Fish oil may help lower your triglyceride and LDL cholesterol levels  It may also increase your HDL cholesterol level  Red yeast rice may help decrease your total cholesterol level and LDL cholesterol level  Garlic may help lower your total cholesterol level  Do not take any supplements without talking to your healthcare provider  Food changes you can make to lower your cholesterol levels:  A dietitian can help you create a healthy eating plan  He or she can show you how to read food labels and choose foods low in saturated fat, trans fats, and cholesterol  · Decrease the total amount of fat you eat  Choose lean meats, fat-free or 1% fat milk, and low-fat dairy products, such as yogurt and cheese  Try to limit or avoid red meats  Limit or do not eat fried foods or baked goods, such as cookies  · Replace unhealthy fats with healthy fats  Cook foods in olive oil or canola oil  Choose soft margarines that are low in saturated fat and trans fat  Seeds, nuts, and avocados are other examples of healthy fats  · Eat foods with omega-3 fats  Examples include salmon, tuna, mackerel, walnuts, and flaxseed  Eat fish 2 times per week  Pregnant women should not eat fish that have high levels of mercury, such as shark, swordfish, and javy mackerel  · Increase the amount of high-fiber foods you eat  High-fiber foods can help lower your LDL cholesterol  Aim to get between 20 and 30 grams of fiber each day  Fruits and vegetables are high in fiber  Eat at least 5 servings each day  Other high-fiber foods are whole-grain or whole-wheat breads, pastas, or cereals, and brown rice  Eat 3 ounces of whole-grain foods each day  Increase fiber slowly  You may have abdominal discomfort, bloating, and gas if you add fiber to your diet too quickly  · Eat healthy protein foods  Examples include low-fat dairy products, skinless chicken and turkey, fish, and nuts      · Limit foods and drinks that are high in sugar  Your dietitian or healthcare provider can help you create daily limits for high-sugar foods and drinks  The limit may be lower if you have diabetes or another health condition  Limits can also help you lose weight if needed  Lifestyle changes you can make to lower your cholesterol levels:   · Maintain a healthy weight  Ask your healthcare provider what a healthy weight is for you  Ask him or her to help you create a weight loss plan if needed  Weight loss can decrease your total cholesterol and triglyceride levels  Weight loss may also help keep your blood pressure at a healthy level  · Be physically active throughout the day  Physical activity, such as exercise, can help lower your total cholesterol level and maintain a healthy weight  Physical activity can also help increase your HDL cholesterol level  Work with your healthcare provider to create an program that is right for you  Get at least 30 to 40 minutes of moderate physical activity most days of the week  Examples of exercise include brisk walking, swimming, or biking  Also include strength training at least 2 times each week  Your healthcare providers can help you create a physical activity plan  · Do not smoke  Nicotine and other chemicals in cigarettes and cigars can raise your cholesterol levels  Ask your healthcare provider for information if you currently smoke and need help to quit  E-cigarettes or smokeless tobacco still contain nicotine  Talk to your healthcare provider before you use these products  · Limit or do not drink alcohol  Alcohol can increase your triglyceride levels  Ask your healthcare provider before you drink alcohol  Ask how much is okay for you to drink in 24 hours or 1 week  Follow up with your doctor as directed:  Write down your questions so you remember to ask them during your visits    © Copyright [employer] 2022 Information is for End User's use only and may not be sold, redistributed or otherwise used for commercial purposes  All illustrations and images included in CareNotes® are the copyrighted property of [employer]  or Blaire Adams  The above information is an  only  It is not intended as medical advice for individual conditions or treatments  Talk to your doctor, nurse or pharmacist before following any medical regimen to see if it is safe and effective for you

## 2023-01-20 NOTE — PROGRESS NOTES
ADULT ANNUAL [address] [place]    NAME: Myranda Andrew  AGE: 81 y o   SEX: male  : 1950     DATE: 2023     Assessment and Plan:     Problem List Items Addressed This Visit        Cardiovascular and Mediastinum    Essential hypertension    Relevant Medications    amLODIPine (NORVASC) 5 mg tablet       Other    BPH associated with nocturia    Relevant Orders    Ambulatory Referral to Urology    Erectile dysfunction    Relevant Orders    Ambulatory Referral to Urology   Other Visit Diagnoses     Annual physical exam    -  Primary    Screening for colorectal cancer        Relevant Orders    Occult Blood, Fecal Immunochemical    Encounter for lipid screening for cardiovascular disease        Relevant Orders    Lipid Panel with Direct LDL reflex    Screening for diabetes mellitus (DM)        Relevant Orders    HEMOGLOBIN A1C W/ EAG ESTIMATION    Halitosis        Relevant Orders    Ambulatory Referral to Dentistry    Chronic pain of right knee        Relevant Medications    Diclofenac Sodium (VOLTAREN) 1 %    Other Relevant Orders    XR knee 3 vw right non injury    Wears glasses        Relevant Orders    Ambulatory Referral to Optometry    Hyperlipidemia        Relevant Medications    atorvastatin (LIPITOR) 40 mg tablet        · Halitosis: no tonsil stones seen on exam  Will send to dentist  If interventions do not help, consider flonase and/or eval + treatment of gastroesophageal reflux  · Right knee pain: Likely hamstring tendinitis, but may be osteoarthritis--> will trial conservative treatment with Voltaren gel and get x-ray  · Optometry referral for glasses  · Expressed importance of continuing atorvastatin 40 mg daily; will recheck lipid panel  · Screening type 2 diabetes with hemoglobin A1c given age and comorbidities  · Colorectal cancer screening: Prior FIT test in  normal; does not have insurance so cannot afford colonoscopy--> will repeat FIT test  · Referral with urology per his request for prostate and erectile dysfunction  · Hypertension /77  Was recommended to take amlodipine 10 mg (two 5 mg tablets at last visit), but that was not changed in the system and he has still been only taking 1 tablet  Home blood pressure monitoring reveals that his BP is usually 110-120s/70-80s  Recommended he take two 5 mg tablets and changed it in his medication list       Follow-up in 6 months for HTN    Immunizations and preventive care screenings were discussed with patient today  Appropriate education was printed on patient's after visit summary  Counseling:  · Dental Health: discussed importance of regular tooth brushing, flossing, and dental visits  Return in about 6 months (around 7/20/2023) for HTN followup  Chief Complaint:     Chief Complaint   Patient presents with   • Annual Exam      History of Present Illness:     Adult Annual Physical   Patient here for a comprehensive physical exam  The patient reports problems:  -Halitosis despite cleaning dentures daily and using breath mints and mouthwash  -Right lower extremity pain: right knee, radiates into posterior thigh, 5/10 in severity now, but at end of the day 8/10, dull aching pain; past history of injuring that knee from work ("torsion") which improved with physical therapy, but recurring  Diet and Physical Activity  · Diet/Nutrition: well balanced diet, consuming 3-5 servings of fruits/vegetables daily and adequate fiber intake  High carb  · Exercise: no formal exercise  Very active from work  Depression Screening  PHQ-2/9 Depression Screening    Little interest or pleasure in doing things: 0 - not at all  Feeling down, depressed, or hopeless: 0 - not at all       [address]  · Sleep: sleeps poorly  Poor sleep hygiene with TV watching before bed  · Hearing: normal b/l  · Vision: wears glasses  Wants to see eye doctor    · Dental: Has dentures, cleans regularly  Has not seen dentist in a while  Having issues with bad breath   Health  · Symptoms include: none  Periodic ED  Review of Systems:     Review of Systems   Constitutional: Negative for chills and fever  HENT: Negative for ear pain and sore throat  Halitosis   Eyes: Negative for pain and visual disturbance  Respiratory: Negative for cough and shortness of breath  Cardiovascular: Negative for chest pain and palpitations  Gastrointestinal: Negative for abdominal pain and vomiting  Genitourinary: Negative for dysuria and hematuria  Musculoskeletal: Negative for arthralgias and back pain  Right knee pain   Skin: Negative for color change and rash  Neurological: Negative for seizures and syncope  All other systems reviewed and are negative       Past Medical History:     Past Medical History:   Diagnosis Date   • BPH (benign prostatic hyperplasia)    • Erectile dysfunction    • Hypertension    • Medical history unknown       Past Surgical History:     Past Surgical History:   Procedure Laterality Date   • HERNIA REPAIR      Last Assessed: 12/6/2017      Family History:     Family History   Problem Relation Age of Onset   • Parkinsonism Mother         Symptomatic Parkinson Disease    • Coronary artery disease Father    • Heart attack Father       Social History:     Social History     Socioeconomic History   • Marital status: Single     Spouse name: None   • Number of children: None   • Years of education: None   • Highest education level: None   Occupational History   • None   Tobacco Use   • Smoking status: Former   • Smokeless tobacco: Never   Vaping Use   • Vaping Use: Never used   Substance and Sexual Activity   • Alcohol use: Yes     Comment: Social Drinker    • Drug use: No   • Sexual activity: None   Other Topics Concern   • None   Social History Narrative   • None     Social Determinants of Health     Financial Resource Strain: Low Risk    • Difficulty of Paying Living Expenses: Not very hard   Food Insecurity: No Food Insecurity   • Worried About Running Out of Food in the Last Year: Never true   • Ran Out of Food in the Last Year: Never true   Transportation Needs: No Transportation Needs   • Lack of Transportation (Medical): No   • Lack of Transportation (Non-Medical): No   Physical Activity: Not on file   Stress: Not on file   Social Connections: Not on file   Intimate Partner Violence: Not on file   Housing Stability: Low Risk    • Unable to Pay for Housing in the Last Year: No   • Number of Places Lived in the Last Year: 1   • Unstable Housing in the Last Year: No      Current Medications:     Current Outpatient Medications   Medication Sig Dispense Refill   • amLODIPine (NORVASC) 5 mg tablet Take 2 tablets (10 mg total) by mouth daily Take 1 tablet by mouth once daily 180 tablet 3   • atorvastatin (LIPITOR) 40 mg tablet Take 1 tablet (40 mg total) by mouth daily 90 tablet 3   • Diclofenac Sodium (VOLTAREN) 1 % Apply 2 g topically 4 (four) times a day 150 g 0   • tadalafil (CIALIS) 5 MG tablet TAKE 1 TABLET BY MOUTH ONCE DAILY AS NEEDED FOR ERECTILE DYSFUNCTION 10 tablet 3   • terazosin (HYTRIN) 2 mg capsule Take 1 capsule by mouth once daily at bedtime 30 capsule 0     No current facility-administered medications for this visit  Allergies:     No Known Allergies   Physical Exam:     /77 (BP Location: Right arm, Patient Position: Sitting, Cuff Size: Standard)   Pulse 80   Temp 98 3 °F (36 8 °C) (Temporal)   Ht 5' 5" (1 651 m)   Wt 71 1 kg (156 lb 12 8 oz)   SpO2 97%   BMI 26 09 kg/m²     Physical Exam  Vitals reviewed  Constitutional:       General: He is not in acute distress  Appearance: He is well-developed  He is not diaphoretic  HENT:      Head: Normocephalic and atraumatic  Mouth/Throat:      Mouth: Mucous membranes are moist       Pharynx: Oropharynx is clear   No oropharyngeal exudate or posterior oropharyngeal erythema  Comments: Dentures present, no mucus or thrush visualized  Eyes:      General: No scleral icterus  Conjunctiva/sclera: Conjunctivae normal    Neck:      Thyroid: No thyromegaly  Trachea: No tracheal deviation  Cardiovascular:      Rate and Rhythm: Normal rate and regular rhythm  Heart sounds: Normal heart sounds  Pulmonary:      Effort: Pulmonary effort is normal       Breath sounds: Normal breath sounds  Abdominal:      General: Bowel sounds are normal  There is no distension  Palpations: Abdomen is soft  Tenderness: There is no abdominal tenderness  Musculoskeletal:      Cervical back: Neck supple  Right knee: No swelling, deformity, erythema, bony tenderness or crepitus  Normal range of motion  Tenderness (at biceps femoris insertion point) present  No LCL laxity, MCL laxity, ACL laxity or PCL laxity  Normal patellar mobility  Left knee: No swelling, deformity, erythema or bony tenderness  Normal range of motion  No tenderness  Lymphadenopathy:      Cervical: No cervical adenopathy  Skin:     General: Skin is warm  Coloration: Skin is not pale  Findings: No erythema  Neurological:      Mental Status: He is alert and oriented to person, place, and time  Psychiatric:         Mood and Affect: Mood normal          Behavior: Behavior normal          Thought Content:  Thought content normal          Judgment: Judgment normal           Mychal Hernandez DO  [address]

## 2023-01-23 ENCOUNTER — TELEPHONE (OUTPATIENT)
Dept: INTERNAL MEDICINE CLINIC | Facility: CLINIC | Age: 73
End: 2023-01-23

## 2023-01-23 RX ORDER — AMLODIPINE BESYLATE 5 MG/1
10 TABLET ORAL DAILY
Qty: 180 TABLET | Refills: 3 | Status: SHIPPED | OUTPATIENT
Start: 2023-01-23

## 2023-01-23 NOTE — PROGRESS NOTES
1/27/2023    Sivan Christie  1950  [id number]      Assessment  -BPH with lower urinary tract symptoms  -Prostate cancer screening  -Erectile dysfunction    Discussion/Plan  Vazquez Hyde is a 81 y o  male being managed by our office    1  BPH with lower urinary tract symptoms- PVR in the office today is 23 mL  He has no urinary complaints  Patient will remain on terazosin 2 mg daily which is managed by his PCP  Discussed dietary and behavioral modifications  2  Prostate cancer screening- unfortunately, patient did not obtain PSA prior to today's visit  Provided patient with order for PSA which he will obtain in the next 1 to 2 weeks and we will call with his results  No abnormal findings noted on digital rectal examination today  3  Erectile dysfunction-  continue Cialis as needed prior to sexual activity  Call with results of PSA  He will follow-up in 1 year with PSA, LAVINIA, and PVR assessment  Patient was advised to call sooner with any additional questions or issues     -All questions answered, patient agrees with plan      History of Present Illness  81 y o  male with a history of BPH, prostate cancer screening, and ED presents today for follow up  Patient last seen in the office in January 2022  He remains on terazosin 2mg daily, which is managed by his PCP, and Cialis as needed prior to sexual activity  Patient denies any lower urinary tract symptoms, gross hematuria, or dysuria  He feels he is emptying his bladder to completion  Last PSA from 1/18/2022 was 0 4  Patient denies any strong family history of prostate malignancy  No changes to his overall health  Review of Systems  Review of Systems   Constitutional: Negative  HENT: Negative  Respiratory: Negative  Cardiovascular: Negative  Gastrointestinal: Negative  Genitourinary: Negative for decreased urine volume, difficulty urinating, dysuria, flank pain, frequency, hematuria and urgency  Musculoskeletal: Negative  Skin: Negative  Neurological: Negative  Psychiatric/Behavioral: Negative  Past Medical History  Past Medical History:   Diagnosis Date   • BPH (benign prostatic hyperplasia)    • Erectile dysfunction    • Hypertension    • Medical history unknown        Past Social History  Past Surgical History:   Procedure Laterality Date   • HERNIA REPAIR      Last Assessed: 12/6/2017       Past Family History  Family History   Problem Relation Age of Onset   • Parkinsonism Mother         Symptomatic Parkinson Disease    • Coronary artery disease Father    • Heart attack Father        Past Social history  Social History     Socioeconomic History   • Marital status: Single     Spouse name: Not on file   • Number of children: Not on file   • Years of education: Not on file   • Highest education level: Not on file   Occupational History   • Not on file   Tobacco Use   • Smoking status: Former   • Smokeless tobacco: Never   Vaping Use   • Vaping Use: Never used   Substance and Sexual Activity   • Alcohol use: Yes     Comment: Social Drinker    • Drug use: No   • Sexual activity: Not on file   Other Topics Concern   • Not on file   Social History Narrative   • Not on file     Social Determinants of Health     Financial Resource Strain: Low Risk    • Difficulty of Paying Living Expenses: Not very hard   Food Insecurity: No Food Insecurity   • Worried About Running Out of Food in the Last Year: Never true   • Ran Out of Food in the Last Year: Never true   Transportation Needs: No Transportation Needs   • Lack of Transportation (Medical): No   • Lack of Transportation (Non-Medical):  No   Physical Activity: Not on file   Stress: Not on file   Social Connections: Not on file   Intimate Partner Violence: Not on file   Housing Stability: Low Risk    • Unable to Pay for Housing in the Last Year: No   • Number of Places Lived in the Last Year: 1   • Unstable Housing in the Last Year: No       Current Medications  Current Outpatient Medications   Medication Sig Dispense Refill   • amLODIPine (NORVASC) 5 mg tablet Take 2 tablets (10 mg total) by mouth daily 180 tablet 3   • atorvastatin (LIPITOR) 40 mg tablet Take 1 tablet (40 mg total) by mouth daily 90 tablet 3   • Diclofenac Sodium (VOLTAREN) 1 % Apply 2 g topically 4 (four) times a day 150 g 0   • tadalafil (CIALIS) 5 MG tablet TAKE 1 TABLET BY MOUTH ONCE DAILY AS NEEDED FOR ERECTILE DYSFUNCTION 10 tablet 3   • terazosin (HYTRIN) 2 mg capsule Take 1 capsule by mouth once daily at bedtime 30 capsule 0     No current facility-administered medications for this visit  Allergies  No Known Allergies    Past Medical History, Social History, Family History, medications and allergies were reviewed  Vitals  There were no vitals filed for this visit  Physical Exam  Physical Exam  Constitutional:       Appearance: Normal appearance  He is well-developed  HENT:      Head: Normocephalic  Eyes:      Pupils: Pupils are equal, round, and reactive to light  Pulmonary:      Effort: Pulmonary effort is normal    Abdominal:      Palpations: Abdomen is soft  Genitourinary:     Prostate: Normal       Rectum: Normal       Comments: Prostate 45gm, smooth, nontender, non nodules  Musculoskeletal:         General: Normal range of motion  Cervical back: Normal range of motion  Skin:     General: Skin is warm and dry  Neurological:      General: No focal deficit present  Mental Status: He is alert and oriented to person, place, and time  Psychiatric:         Mood and Affect: Mood normal          Behavior: Behavior normal          Thought Content:  Thought content normal          Judgment: Judgment normal          Results    I have personally reviewed all pertinent lab results and reviewed with patient  Lab Results   Component Value Date    PSA 0 4 01/18/2022    PSA 0 6 03/04/2020    PSA 0 4 12/11/2018     Lab Results   Component Value Date    CALCIUM 10 0 11/11/2022    K 4 3 11/11/2022    CO2 25 11/11/2022     11/11/2022    BUN 16 11/11/2022    CREATININE 0 75 11/11/2022     Lab Results   Component Value Date    WBC 9 06 11/11/2022    HGB 14 3 11/11/2022    HCT 44 8 11/11/2022    MCV 86 11/11/2022     11/11/2022     No results found for this or any previous visit (from the past 1 hour(s))

## 2023-01-23 NOTE — TELEPHONE ENCOUNTER
Received fax from [facility] DR JAZMYN DANIELSON requesting a new script for amLODIPine Besylate 5 MG Oral Tablet (NORVASC)  The SIG has 2 sets of directions  Which one is correct? Please update script and resend

## 2023-01-27 ENCOUNTER — OFFICE VISIT (OUTPATIENT)
Dept: UROLOGY | Facility: AMBULATORY SURGERY CENTER | Age: 73
End: 2023-01-27

## 2023-01-27 VITALS
WEIGHT: 156 LBS | OXYGEN SATURATION: 96 % | HEART RATE: 78 BPM | SYSTOLIC BLOOD PRESSURE: 142 MMHG | HEIGHT: 65 IN | BODY MASS INDEX: 25.99 KG/M2 | DIASTOLIC BLOOD PRESSURE: 88 MMHG | RESPIRATION RATE: 18 BRPM

## 2023-01-27 DIAGNOSIS — N52.9 ERECTILE DYSFUNCTION, UNSPECIFIED ERECTILE DYSFUNCTION TYPE: ICD-10-CM

## 2023-01-27 DIAGNOSIS — I10 ESSENTIAL HYPERTENSION: ICD-10-CM

## 2023-01-27 DIAGNOSIS — R35.1 BPH ASSOCIATED WITH NOCTURIA: Primary | ICD-10-CM

## 2023-01-27 DIAGNOSIS — N40.1 BPH ASSOCIATED WITH NOCTURIA: Primary | ICD-10-CM

## 2023-01-27 DIAGNOSIS — Z12.5 SCREENING FOR PROSTATE CANCER: ICD-10-CM

## 2023-01-27 LAB — POST-VOID RESIDUAL VOLUME, ML POC: 23 ML

## 2023-01-27 RX ORDER — TERAZOSIN 2 MG/1
2 CAPSULE ORAL
Qty: 30 CAPSULE | Refills: 11 | Status: SHIPPED | OUTPATIENT
Start: 2023-01-27

## 2023-01-30 ENCOUNTER — APPOINTMENT (OUTPATIENT)
Dept: LAB | Facility: HOSPITAL | Age: 73
End: 2023-01-30

## 2023-01-30 DIAGNOSIS — Z12.11 SCREENING FOR COLORECTAL CANCER: ICD-10-CM

## 2023-01-30 DIAGNOSIS — Z12.12 SCREENING FOR COLORECTAL CANCER: ICD-10-CM

## 2023-01-30 LAB — HEMOCCULT STL QL IA: NEGATIVE

## 2023-02-03 ENCOUNTER — APPOINTMENT (OUTPATIENT)
Dept: LAB | Facility: CLINIC | Age: 73
End: 2023-02-03

## 2023-02-03 DIAGNOSIS — Z13.220 ENCOUNTER FOR LIPID SCREENING FOR CARDIOVASCULAR DISEASE: ICD-10-CM

## 2023-02-03 DIAGNOSIS — Z13.6 ENCOUNTER FOR LIPID SCREENING FOR CARDIOVASCULAR DISEASE: ICD-10-CM

## 2023-02-03 DIAGNOSIS — Z12.5 SCREENING FOR PROSTATE CANCER: ICD-10-CM

## 2023-02-03 DIAGNOSIS — Z13.1 SCREENING FOR DIABETES MELLITUS (DM): ICD-10-CM

## 2023-02-03 LAB
CHOLEST SERPL-MCNC: 131 MG/DL
EST. AVERAGE GLUCOSE BLD GHB EST-MCNC: 100 MG/DL
HBA1C MFR BLD: 5.1 %
HDLC SERPL-MCNC: 69 MG/DL
LDLC SERPL CALC-MCNC: 46 MG/DL (ref 0–100)
PSA SERPL-MCNC: 0.4 NG/ML (ref 0–4)
TRIGL SERPL-MCNC: 80 MG/DL

## 2023-02-06 ENCOUNTER — TELEPHONE (OUTPATIENT)
Dept: UROLOGY | Facility: AMBULATORY SURGERY CENTER | Age: 73
End: 2023-02-06

## 2023-02-06 NOTE — TELEPHONE ENCOUNTER
Called patient daughter told her pt PSA was stable 0 4 and the plan is to follow up in a year  Pt daughter understood everything and will let him know to follow up in a year with us

## 2023-02-06 NOTE — TELEPHONE ENCOUNTER
----- Message from [id number] Madhu Maria sent at 2/6/2023  7:25 AM EST -----  Please inform patient results of PSA remain stable at 0 4  Plan to follow up in 1 year

## 2023-08-13 DIAGNOSIS — N52.9 ERECTILE DYSFUNCTION, UNSPECIFIED ERECTILE DYSFUNCTION TYPE: ICD-10-CM

## 2023-08-14 RX ORDER — TADALAFIL 5 MG/1
TABLET ORAL
Qty: 10 TABLET | Refills: 3 | Status: SHIPPED | OUTPATIENT
Start: 2023-08-14

## 2023-09-29 ENCOUNTER — OFFICE VISIT (OUTPATIENT)
Dept: INTERNAL MEDICINE CLINIC | Facility: CLINIC | Age: 73
End: 2023-09-29

## 2023-09-29 VITALS
TEMPERATURE: 98.1 F | DIASTOLIC BLOOD PRESSURE: 80 MMHG | WEIGHT: 166 LBS | SYSTOLIC BLOOD PRESSURE: 138 MMHG | BODY MASS INDEX: 27.66 KG/M2 | HEIGHT: 65 IN | HEART RATE: 90 BPM

## 2023-09-29 DIAGNOSIS — Z00.00 ANNUAL PHYSICAL EXAM: ICD-10-CM

## 2023-09-29 DIAGNOSIS — R35.1 BPH ASSOCIATED WITH NOCTURIA: ICD-10-CM

## 2023-09-29 DIAGNOSIS — I10 ESSENTIAL HYPERTENSION: ICD-10-CM

## 2023-09-29 DIAGNOSIS — N40.1 BPH ASSOCIATED WITH NOCTURIA: ICD-10-CM

## 2023-09-29 PROCEDURE — 99213 OFFICE O/P EST LOW 20 MIN: CPT | Performed by: INTERNAL MEDICINE

## 2023-09-29 PROCEDURE — 90471 IMMUNIZATION ADMIN: CPT | Performed by: INTERNAL MEDICINE

## 2023-09-29 PROCEDURE — 90686 IIV4 VACC NO PRSV 0.5 ML IM: CPT | Performed by: INTERNAL MEDICINE

## 2023-09-29 NOTE — PROGRESS NOTES
Name: Wesley Gunn      : 1950      MRN: [id number]  Encounter Provider: Cole Horvath MD  Encounter Date: 2023   Encounter department: [address]     1. Annual physical exam  -     FLUZONE: influenza vaccine, quadrivalent, 0.5 mL    2. BPH associated with nocturia  Continue medications as prescribed    3. Essential hypertension  Blood pressure 138/80, well controlled. Continue amlodipine 10 mg         Subjective      HPI     43-year-old male with history of hypertension and hyperlipidemia presenting for follow-up on blood pressure management. Patient seen 6 months ago in January for his annual physical and at that time was taking amlodipine 5 mg. Patient was increased to amlodipine 10 mg and states has been taking 2 tablets a day daily. His blood pressure has been well controlled with amlodipine 10 mg. Has been taking his Lipitor 40 mg and does take terazosin for his BPH. Patient recently saw urology at that time was noted to have a negative PSA normal PVR and was instructed to continue to take [place] in. Patient has no complaints at this point in time. Denies chest pain shortness of breath fever chills nausea vomiting abdominal pain. Patient is up-to-date on all his vaccinations but would like the flu shot today. Review of Systems   Constitutional: Negative for chills and fever. HENT: Negative for ear pain and sore throat. Eyes: Negative for pain and visual disturbance. Respiratory: Negative for cough and shortness of breath. Cardiovascular: Negative for chest pain and palpitations. Gastrointestinal: Negative for abdominal pain and vomiting. Genitourinary: Negative for dysuria and hematuria. Musculoskeletal: Negative for arthralgias and back pain. Skin: Negative for color change and rash. Neurological: Negative for seizures and syncope. All other systems reviewed and are negative.       Current Outpatient Medications on File Prior to Visit   Medication Sig   • amLODIPine (NORVASC) 5 mg tablet Take 2 tablets (10 mg total) by mouth daily   • atorvastatin (LIPITOR) 40 mg tablet Take 1 tablet (40 mg total) by mouth daily   • tadalafil (CIALIS) 5 MG tablet TAKE 1 TABLET BY MOUTH ONCE DAILY AS NEEDED FOR ERECTILE DYSFUNCTION   • terazosin (HYTRIN) 2 mg capsule Take 1 capsule (2 mg total) by mouth daily at bedtime   • Diclofenac Sodium (VOLTAREN) 1 % Apply 2 g topically 4 (four) times a day (Patient not taking: Reported on 9/29/2023)       Objective     /80 (BP Location: Right arm, Patient Position: Sitting, Cuff Size: Large)   Pulse 90   Temp 98.1 °F (36.7 °C) (Temporal)   Ht 5' 5" (1.651 m)   Wt 75.3 kg (166 lb)   BMI 27.62 kg/m²     Physical Exam  Constitutional:       Appearance: Normal appearance. HENT:      Nose: Nose normal.      Mouth/Throat:      Mouth: Mucous membranes are moist.      Pharynx: Oropharynx is clear. Eyes:      Extraocular Movements: Extraocular movements intact. Conjunctiva/sclera: Conjunctivae normal.   Cardiovascular:      Rate and Rhythm: Normal rate and regular rhythm. Pulmonary:      Effort: Pulmonary effort is normal.      Breath sounds: Normal breath sounds. Abdominal:      General: Abdomen is flat. Bowel sounds are normal.      Palpations: Abdomen is soft. Musculoskeletal:         General: Normal range of motion. Cervical back: Normal range of motion. Right lower leg: No edema. Left lower leg: No edema. Neurological:      General: No focal deficit present. Mental Status: He is alert and oriented to person, place, and time.        Nancy Thornton MD

## 2023-10-23 ENCOUNTER — OFFICE VISIT (OUTPATIENT)
Dept: INTERNAL MEDICINE CLINIC | Facility: CLINIC | Age: 73
End: 2023-10-23
Payer: MEDICARE

## 2023-10-23 DIAGNOSIS — I10 ESSENTIAL HYPERTENSION: ICD-10-CM

## 2023-10-23 DIAGNOSIS — N52.9 ERECTILE DYSFUNCTION, UNSPECIFIED ERECTILE DYSFUNCTION TYPE: ICD-10-CM

## 2023-10-23 DIAGNOSIS — M19.041 OSTEOARTHRITIS OF BOTH HANDS, UNSPECIFIED OSTEOARTHRITIS TYPE: ICD-10-CM

## 2023-10-23 DIAGNOSIS — M25.40 JOINT SWELLING: ICD-10-CM

## 2023-10-23 DIAGNOSIS — E78.5 HYPERLIPIDEMIA, UNSPECIFIED HYPERLIPIDEMIA TYPE: ICD-10-CM

## 2023-10-23 DIAGNOSIS — Z12.11 SCREENING FOR COLON CANCER: ICD-10-CM

## 2023-10-23 DIAGNOSIS — H53.8 BLURRY VISION, BILATERAL: Primary | ICD-10-CM

## 2023-10-23 DIAGNOSIS — M19.042 OSTEOARTHRITIS OF BOTH HANDS, UNSPECIFIED OSTEOARTHRITIS TYPE: ICD-10-CM

## 2023-10-23 PROCEDURE — 99214 OFFICE O/P EST MOD 30 MIN: CPT | Performed by: INTERNAL MEDICINE

## 2023-10-23 RX ORDER — TADALAFIL 5 MG/1
5 TABLET ORAL DAILY PRN
Qty: 10 TABLET | Refills: 3 | Status: SHIPPED | OUTPATIENT
Start: 2023-10-23

## 2023-10-23 NOTE — PROGRESS NOTES
INTERNAL MEDICINE INITIAL OFFICE VISIT  [facility] - [facility] INTERNAL MEDICINE [place]    NAME: Scharlene Frankel  AGE: 72 y.o. SEX: male  : 1950     DATE: 10/24/2023     Assessment and Plan:     Problem List Items Addressed This Visit       Osteoarthritis of both hands     With intermittent joint swelling, will check x-ray hands to rule out other causes and decide on further workup. Explained the natural history of the disease, strong family history. Essential hypertension     Well controlled on terazosin 2mg and norvasc 5 mg, continue the same. Check blood work before next visit. Relevant Orders    CBC and Platelet    Comprehensive metabolic panel    Urinalysis with microscopic    Erectile dysfunction     On cialis, refilled. Relevant Medications    tadalafil (CIALIS) 5 MG tablet    Blurry vision, bilateral - Primary     Was told he has cataracts and need to be surgically repaired, will refer to specialist.          Relevant Orders    Ambulatory Referral to Ophthalmology    Hyperlipidemia     Well controlled on atorvastatin 40mg, check lipid panel prior to next visit. Relevant Orders    Lipid Panel with Direct LDL reflex    Joint swelling     On right 5th 2nd interphalangeal joint with no redness, mild pain. Check x-ray to rule out other causes but likely OA. Relevant Orders    XR hand 3+ vw left    XR hand 3+ vw right     Other Visit Diagnoses       Screening for colon cancer        Relevant Orders    Ambulatory Referral to Gastroenterology              BMI Counseling: Body mass index is 27.46 kg/m². The BMI is above normal. Nutrition recommendations include encouraging healthy choices of fruits and vegetables and moderation in carbohydrate intake. Exercise recommendations include exercising 3-5 times per week. No pharmacotherapy was ordered. Rationale for BMI follow-up plan is due to patient being overweight or obese.    Return in about 3 months (around 1/23/2024). For Annual Physical     Chief Complaint:     Chief Complaint   Patient presents with    Establish Care    Hypertension      History of Present Illness:     Patient presents in the office to establish care. Patient is [place] speaking and this encounter was done by me in [facility]. Wife is in the room and provides some history. Patient has a hx of HTN, HL, ED, and OA was seen by family Med at [facility] up to date with his screenings and blood work. Up to date with vaccinations. He wishes to do a colonoscopy now he is insured, he has done stool based testing since now, with all negative results. He was also told he has bilateral cataracts by an optometrist and would like to have this repaired. He has blurry vision that has not improved with regular glasses. He works at [employer] for the last 2-3 years. He complaints of worsening  and intermittent swelling of his DIP and PIP joints at different fingers. He was diagnosed with OA several years ago, has family history on his mother of OA with multiple Herberden's nodes. He states was never worked-up for inflammatory joint disease in the past, he had a hand x ray many years aback in [facility], but it was normal per him. He also complaints of halitosis for several years, has tried to improve his oral hygiene but nothing seems to help. He also complaints of a enlarged gland on the R submandibular area that is presents for several years. He denies any regurgitation, dysphagia or odynophagia. No weight loss, night sweats or change in appetite. The following portions of the patient's history were reviewed and updated as appropriate: allergies, current medications, past family history, past medical history, past social history, past surgical history and problem list.     Review of Systems:     Review of Systems   Constitutional:  Negative for appetite change and fatigue.    HENT:  Negative for dental problem, sinus pain, sore throat and trouble swallowing. Eyes:  Positive for visual disturbance (blurry vision). Respiratory:  Negative for cough, chest tightness, shortness of breath and wheezing. Cardiovascular:  Negative for chest pain, palpitations and leg swelling. Gastrointestinal:  Negative for abdominal pain, nausea and vomiting. Genitourinary:  Negative for difficulty urinating and frequency. Musculoskeletal:  Positive for arthralgias and joint swelling (fingers). Skin:  Negative for rash. Neurological:  Negative for dizziness and headaches. Psychiatric/Behavioral:  Negative for confusion. The patient is not nervous/anxious. Past Medical History:     Past Medical History:   Diagnosis Date    BPH (benign prostatic hyperplasia)     Erectile dysfunction     Hypertension     Medical history unknown         Past Surgical History:     Past Surgical History:   Procedure Laterality Date    HERNIA REPAIR      Last Assessed: 12/6/2017        Social History:   He reports that he has quit smoking. He has never used smokeless tobacco. He reports current alcohol use. He reports that he does not use drugs.      Family History:     Family History   Problem Relation Age of Onset    Parkinsonism Mother         Symptomatic Parkinson Disease     Coronary artery disease Father     Heart attack Father         Current Medications:     Current Outpatient Medications:     amLODIPine (NORVASC) 5 mg tablet, Take 2 tablets (10 mg total) by mouth daily, Disp: 180 tablet, Rfl: 3    atorvastatin (LIPITOR) 40 mg tablet, Take 1 tablet (40 mg total) by mouth daily, Disp: 90 tablet, Rfl: 3    tadalafil (CIALIS) 5 MG tablet, Take 1 tablet (5 mg total) by mouth daily as needed for erectile dysfunction, Disp: 10 tablet, Rfl: 3    terazosin (HYTRIN) 2 mg capsule, Take 1 capsule (2 mg total) by mouth daily at bedtime, Disp: 30 capsule, Rfl: 11     Allergies:   No Known Allergies     Physical Exam:     /80 (BP Location: Left arm, Patient Position: Sitting, Cuff Size: Adult)   Pulse 80   Temp 98.7 °F (37.1 °C) (Tympanic)   Resp 16   Wt 74.8 kg (165 lb)   SpO2 97%   BMI 27.46 kg/m²     Physical Exam  Vitals and nursing note reviewed. Constitutional:       General: He is not in acute distress. Appearance: He is well-developed. HENT:      Head: Normocephalic and atraumatic. Right Ear: Tympanic membrane normal.      Left Ear: Tympanic membrane normal.      Nose: Nose normal.      Mouth/Throat:      Mouth: Mucous membranes are moist.      Pharynx: Oropharynx is clear. Eyes:      Conjunctiva/sclera: Conjunctivae normal.      Pupils: Pupils are equal, round, and reactive to light. Neck:      Thyroid: No thyromegaly. Cardiovascular:      Rate and Rhythm: Normal rate and regular rhythm. Heart sounds: Normal heart sounds. Pulmonary:      Effort: No respiratory distress. Breath sounds: Normal breath sounds. No wheezing. Abdominal:      General: Bowel sounds are normal. There is no distension. Palpations: Abdomen is soft. Tenderness: There is no abdominal tenderness. Musculoskeletal:         General: Swelling (PIP R fifth finger, Herbeden's nodes in all finger) present. Normal range of motion. Cervical back: Normal range of motion and neck supple. Right lower leg: Edema (trace) present. Left lower leg: Edema (trace) present. Skin:     General: Skin is warm and dry. Capillary Refill: Capillary refill takes less than 2 seconds. Neurological:      Mental Status: He is alert and oriented to person, place, and time. Sensory: No sensory deficit. Motor: No weakness or abnormal muscle tone. Psychiatric:         Thought Content: Thought content normal.         Judgment: Judgment normal.          Data:     Laboratory Results: I have personally reviewed the pertinent laboratory results/reports   Radiology/Other Diagnostic Testing Results: I have personally reviewed pertinent reports.       Ry Gómez MD [place]  [facility] INTERNAL MEDICINE [place]

## 2023-10-24 PROBLEM — M25.40 JOINT SWELLING: Status: ACTIVE | Noted: 2023-10-24

## 2023-10-24 PROBLEM — H53.8 BLURRY VISION, BILATERAL: Status: ACTIVE | Noted: 2023-10-24

## 2023-10-24 PROBLEM — E78.5 HYPERLIPIDEMIA: Status: ACTIVE | Noted: 2023-10-24

## 2023-10-24 NOTE — ASSESSMENT & PLAN NOTE
On right 5th 2nd interphalangeal joint with no redness, mild pain. Check x-ray to rule out other causes but likely OA.

## 2023-10-24 NOTE — ASSESSMENT & PLAN NOTE
Well controlled on terazosin 2mg and norvasc 5 mg, continue the same. Check blood work before next visit.

## 2023-10-24 NOTE — ASSESSMENT & PLAN NOTE
With intermittent joint swelling, will check x-ray hands to rule out other causes and decide on further workup. Explained the natural history of the disease, strong family history.

## 2023-10-25 VITALS
HEIGHT: 66 IN | BODY MASS INDEX: 26.52 KG/M2 | RESPIRATION RATE: 16 BRPM | TEMPERATURE: 98.7 F | SYSTOLIC BLOOD PRESSURE: 130 MMHG | WEIGHT: 165 LBS | HEART RATE: 80 BPM | OXYGEN SATURATION: 97 % | DIASTOLIC BLOOD PRESSURE: 80 MMHG

## 2023-10-26 ENCOUNTER — TELEPHONE (OUTPATIENT)
Dept: INTERNAL MEDICINE CLINIC | Facility: CLINIC | Age: 73
End: 2023-10-26

## 2023-10-26 DIAGNOSIS — H25.9 AGE-RELATED CATARACT OF BOTH EYES, UNSPECIFIED AGE-RELATED CATARACT TYPE: Primary | ICD-10-CM

## 2023-10-26 NOTE — TELEPHONE ENCOUNTER
Daughter called stating she called for eye appointment at  Dr. Mary Alice Tierney office and was given an appointment for February but because of cataract issue she asked for a sooner appointment and they told her to let Dr. Jade Sánchez know if you could write another RX stating needs sooner appointment due to advanced cataract.

## 2023-10-27 ENCOUNTER — HOSPITAL ENCOUNTER (OUTPATIENT)
Dept: RADIOLOGY | Facility: HOSPITAL | Age: 73
Discharge: HOME | End: 2023-10-27
Payer: MEDICARE

## 2023-10-27 DIAGNOSIS — M25.40 JOINT SWELLING: ICD-10-CM

## 2023-10-27 PROCEDURE — 73130 X-RAY EXAM OF HAND: CPT

## 2024-01-12 ENCOUNTER — CONSULT (OUTPATIENT)
Dept: INTERNAL MEDICINE CLINIC | Facility: CLINIC | Age: 74
End: 2024-01-12
Payer: MEDICARE

## 2024-01-12 VITALS
DIASTOLIC BLOOD PRESSURE: 80 MMHG | HEART RATE: 80 BPM | RESPIRATION RATE: 16 BRPM | HEIGHT: 66 IN | TEMPERATURE: 97.6 F | OXYGEN SATURATION: 97 % | WEIGHT: 164 LBS | SYSTOLIC BLOOD PRESSURE: 140 MMHG | BODY MASS INDEX: 26.36 KG/M2

## 2024-01-12 DIAGNOSIS — Z01.818 PRE-OPERATIVE CLEARANCE: Primary | ICD-10-CM

## 2024-01-12 PROCEDURE — 99214 OFFICE O/P EST MOD 30 MIN: CPT

## 2024-01-12 NOTE — PROGRESS NOTES
INTERNAL MEDICINE PRE-OPERATIVE EVALUATION  [facility] - [facility] INTERNAL MEDICINE [place]    NAME: Jimbo Suresh  AGE: 73 y.o. SEX: male  : 1950     DATE: 2024     Internal Medicine Pre-Operative Evaluation:     Chief Complaint: Pre-operative Evaluation     Surgery: bilateral cataract surgery  Anticipated Date of Surgery: 24 left eye, 24 right eye  Referring Provider: Jocelin Oneill MD      History of Present Illness:     Jimbo Suresh is a 73 y.o. male who presents to the office today for a preoperative consultation at the request of surgeon, Jocelin Oneill MD, who plans on performing cataract surgery on the left and right eyes. Planned anesthesia is  MAC . Patient has no increased bleeding risk. Patient does not have objections to receiving blood products if needed. Patient is not currently on anti-platelet/anti-coagulation medications.     Assessment of Chronic Conditions:   - Hypertension: 140/80 in clinic today, recent priors of 130/80 and 138/80. Continue amlodipine 10 mg daily.  - HLD: Continue atorvastatin 40 mg daily     Assessment of Cardiac Risk:  Denies unstable or severe angina or MI in the last 6 weeks or history of stent placement in the last year   Denies decompensated heart failure (e.g. New onset heart failure, NYHA functional class IV heart failure, or worsening existing heart failure)  Denies significant arrhythmias such as high grade AV block, symptomatic ventricular arrhythmia, newly recognized ventricular tachycardia, supraventricular tachycardia with resting heart rate >100, or symptomatic bradycardia  Denies severe heart valve disease including aortic stenosis or symptomatic mitral stenosis     Exercise Capacity:  Able to walk 4 blocks without symptoms?: Yes. Pt reports walking multiple miles without needing to stop to catch his breath.   Able to walk 2 flights without symptoms?: Yes. Pt reports climbing multiple flights of stairs without needing to  stop to catch his breath.    Prior Anesthesia Reactions: No.     Personal history of venous thromboembolic disease? No    History of steroid use for >2 weeks within last year? No    STOP-BANG Sleep Apnea Screening Questionnaire:      Do you SNORE loudly (louder than talking or loud enough to be heard through closed doors)? No = 0 point   Do you often feel TIRED, fatigued, or sleepy during daytime? No = 0 point   Has anyone OBSERVED you stop breathing during your sleep? No = 0 point   Do you have or are you being treated for high blood pressure? Yes = 1 point   BMI more than 35 kg/m2? No = 0 point   AGE over 50 years old? Yes = 1 point   NECK circumference > 16 inches (40 cm)? No = 0 point   Male GENDER? Yes = 1 point   TOTAL SCORE 3 = INTERMEDIATE risk of CHRISTIAN       Review of Systems:     Review of Systems     Problem List:     Patient Active Problem List   Diagnosis    BPH associated with nocturia    Osteoarthritis of both hands    Essential hypertension    Erectile dysfunction    Blurry vision, bilateral    Hyperlipidemia    Joint swelling        Allergies:     No Known Allergies     Current Medications:       Current Outpatient Medications:     amLODIPine (NORVASC) 5 mg tablet, Take 2 tablets (10 mg total) by mouth daily, Disp: 180 tablet, Rfl: 3    atorvastatin (LIPITOR) 40 mg tablet, Take 1 tablet (40 mg total) by mouth daily, Disp: 90 tablet, Rfl: 3    tadalafil (CIALIS) 5 MG tablet, Take 1 tablet (5 mg total) by mouth daily as needed for erectile dysfunction, Disp: 10 tablet, Rfl: 3    terazosin (HYTRIN) 2 mg capsule, Take 1 capsule (2 mg total) by mouth daily at bedtime, Disp: 30 capsule, Rfl: 11     Past History:     Past Medical History:   Diagnosis Date    BPH (benign prostatic hyperplasia)     Erectile dysfunction     Hypertension     Medical history unknown         Past Surgical History:   Procedure Laterality Date    HERNIA REPAIR      Last Assessed: 12/6/2017        Family History   Problem Relation  "Age of Onset    Parkinsonism Mother         Symptomatic Parkinson Disease     Coronary artery disease Father     Heart attack Father         Social History     Socioeconomic History    Marital status: Single     Spouse name: Not on file    Number of children: Not on file    Years of education: Not on file    Highest education level: Not on file   Occupational History    Not on file   Tobacco Use    Smoking status: Former    Smokeless tobacco: Never   Vaping Use    Vaping status: Never Used   Substance and Sexual Activity    Alcohol use: Yes     Comment: Social Drinker     Drug use: No    Sexual activity: Not on file   Other Topics Concern    Not on file   Social History Narrative    Not on file     Social Determinants of Health     Financial Resource Strain: Low Risk  (1/20/2023)    Overall Financial Resource Strain (CARDIA)     Difficulty of Paying Living Expenses: Not very hard   Food Insecurity: No Food Insecurity (1/20/2023)    Hunger Vital Sign     Worried About Running Out of Food in the Last Year: Never true     Ran Out of Food in the Last Year: Never true   Transportation Needs: No Transportation Needs (1/20/2023)    PRAPARE - Transportation     Lack of Transportation (Medical): No     Lack of Transportation (Non-Medical): No   Physical Activity: Inactive (7/22/2021)    Exercise Vital Sign     Days of Exercise per Week: 0 days     Minutes of Exercise per Session: 0 min   Stress: Not on file   Social Connections: Not on file   Intimate Partner Violence: Not on file   Housing Stability: Low Risk  (4/7/2022)    Housing Stability Vital Sign     Unable to Pay for Housing in the Last Year: No     Number of Places Lived in the Last Year: 1     Unstable Housing in the Last Year: No        Physical Exam:      /80 (BP Location: Left arm, Patient Position: Sitting, Cuff Size: Adult)   Pulse 80   Temp 97.6 °F (36.4 °C) (Tympanic)   Resp 16   Ht 5' 6\" (1.676 m)   Wt 74.4 kg (164 lb)   SpO2 97%   BMI 26.47 " kg/m²     Physical Exam      Data:     Pre-operative work-up    Laboratory Results: I have personally reviewed the pertinent laboratory results/reports     EKG:  No EKG for review . No indication.    Chest x-ray:  No recent CXR for review.  No indication.    Previous cardiopulmonary studies within the past year:  Echocardiogram: None.  Cardiac Catheterization: None.  Stress Test: None.  Pulmonary Function Testing: None       Assessment:     Pt has incidentally noted systolic crescendo-decresendo murmur heard throughout the pericardium without associated symptoms. Denies CP, palpitations, SOB, ALVARADO, lightheadedness at rest or with activity, with reported excellent exercise tolerance. No contraindications to bilateral cataract surgery at time of this assessment.      Plan:     73 y.o. male with planned surgery: bilateral cataract surgery. No medical contraindications to cataract surgery at time of this assessment.      Cardiac Risk Estimation: per the Revised Cardiac Risk Index (Circ. 100:1043, 1999), the patient has no risk factors for cardiac complications, putting him in: RCI RISK CLASS I (0 risk factors, risk of major cardiac compl. appr. 0.5%).    1. Further preoperative workup as follows:   - None; no further preoperative work-up is required    2. Medication Management/Recommendations:   - None, continue medication regimen including morning of surgery, with sip of water    3. Prophylaxis for cardiac events with perioperative beta-blockers: not indicated.    4. Patient requires further consultation with: None    Clearance  There is no medical contraindication to cataract surgery at the time of this evaluation. Pt is medically suitable for the planned procedures.     Arnulfo Everett MD  [facility] INTERNAL MEDICINE [place]  [address] [address]  Phone#  [phone]  Fax#  [phone]

## 2024-01-19 DIAGNOSIS — I10 ESSENTIAL HYPERTENSION: Primary | ICD-10-CM

## 2024-01-19 DIAGNOSIS — E78.5 HYPERLIPIDEMIA: ICD-10-CM

## 2024-01-19 RX ORDER — AMLODIPINE BESYLATE 10 MG/1
10 TABLET ORAL DAILY
Qty: 90 TABLET | Refills: 1 | Status: SHIPPED | OUTPATIENT
Start: 2024-01-19

## 2024-01-19 RX ORDER — ATORVASTATIN CALCIUM 40 MG/1
40 TABLET, FILM COATED ORAL DAILY
Qty: 90 TABLET | Refills: 1 | Status: SHIPPED | OUTPATIENT
Start: 2024-01-19

## 2024-02-02 ENCOUNTER — OFFICE VISIT (OUTPATIENT)
Dept: INTERNAL MEDICINE CLINIC | Facility: CLINIC | Age: 74
End: 2024-02-02
Payer: MEDICARE

## 2024-02-02 VITALS
TEMPERATURE: 98.6 F | BODY MASS INDEX: 26.15 KG/M2 | SYSTOLIC BLOOD PRESSURE: 110 MMHG | OXYGEN SATURATION: 98 % | DIASTOLIC BLOOD PRESSURE: 70 MMHG | HEART RATE: 80 BPM | WEIGHT: 162 LBS | RESPIRATION RATE: 14 BRPM

## 2024-02-02 DIAGNOSIS — I10 ESSENTIAL HYPERTENSION: ICD-10-CM

## 2024-02-02 DIAGNOSIS — H25.9 AGE-RELATED CATARACT OF BOTH EYES, UNSPECIFIED AGE-RELATED CATARACT TYPE: Primary | ICD-10-CM

## 2024-02-02 PROCEDURE — 99213 OFFICE O/P EST LOW 20 MIN: CPT

## 2024-02-02 NOTE — PROGRESS NOTES
Name: Jimbo Suresh      : 1950      MRN: [id number]  Encounter Provider: Radha Melgar MD  Encounter Date: 2024   Encounter department: [facility] INTERNAL MEDICINE [place]    Assessment & Plan     1. Age-related cataract of both eyes, unspecified age-related cataract type  Assessment & Plan:  S/P cataract surgery in the left eye on   Currently on prednisone drop   Has a f/u appointment with his ophthalmologist Dr. Oneill at [facility]        2. Essential hypertension  Assessment & Plan:  BP in office 110/70  Continue amlodipine 10 mg daily.             Subjective   HPI  Jimbo presents for a follow visit. He recently had cataract surgery of the left eye by Dr. Oneill at [facility] on 23. He reports that he has some intermittent blurry vision and swelling, denies any eye pain. He has an appointment today with his ophthalmologist. He states that he thinks he might need a Cardiology eval because he had a faint murmur at his last follow visit. He denies any chest pain or dyspnea. He has no prior history of angina, MI or stroke. On examination patient had a regular rate and rhythm. No murmur detected.    Review of Systems   Constitutional:  Negative for chills and fever.   HENT:  Negative for ear pain and sore throat.    Eyes:  Negative for pain and visual disturbance.   Respiratory:  Negative for cough and shortness of breath.    Cardiovascular:  Negative for chest pain and palpitations.   Gastrointestinal:  Negative for abdominal pain and vomiting.   Genitourinary:  Negative for dysuria and hematuria.   Musculoskeletal:  Negative for arthralgias and back pain.   Skin:  Negative for color change and rash.   Neurological:  Negative for seizures and syncope.   All other systems reviewed and are negative.      Current Outpatient Medications on File Prior to Visit   Medication Sig    amLODIPine (NORVASC) 10 mg tablet Take 1 tablet (10 mg total) by mouth  daily    atorvastatin (LIPITOR) 40 mg tablet Take 1 tablet (40 mg total) by mouth daily    tadalafil (CIALIS) 5 MG tablet Take 1 tablet (5 mg total) by mouth daily as needed for erectile dysfunction    terazosin (HYTRIN) 2 mg capsule Take 1 capsule (2 mg total) by mouth daily at bedtime       Objective     /70 (BP Location: Left arm, Patient Position: Sitting, Cuff Size: Large)   Pulse 80   Temp 98.6 °F (37 °C) (Tympanic)   Resp 14   Wt 73.5 kg (162 lb)   SpO2 98%   BMI 26.15 kg/m²     Physical Exam  HENT:      Head: Normocephalic and atraumatic.      Right Ear: Tympanic membrane normal.      Left Ear: Tympanic membrane normal.      Nose: Nose normal.      Mouth/Throat:      Pharynx: Oropharynx is clear.   Eyes:      Conjunctiva/sclera: Conjunctivae normal.   Cardiovascular:      Rate and Rhythm: Normal rate and regular rhythm.      Pulses: Normal pulses.      Heart sounds: Normal heart sounds.   Pulmonary:      Effort: Pulmonary effort is normal.      Breath sounds: Normal breath sounds.   Abdominal:      General: Bowel sounds are normal.   Musculoskeletal:         General: Normal range of motion.   Skin:     General: Skin is warm.      Capillary Refill: Capillary refill takes less than 2 seconds.   Neurological:      General: No focal deficit present.      Mental Status: He is alert and oriented to person, place, and time.   Psychiatric:         Mood and Affect: Mood normal.       Radha Melgar MD

## 2024-02-02 NOTE — ASSESSMENT & PLAN NOTE
S/P cataract surgery in the left eye on 1/18  Currently on prednisone drop   Has a f/u appointment with his ophthalmologist Dr. Oneill at [facility]

## 2024-03-08 DIAGNOSIS — N52.9 ERECTILE DYSFUNCTION, UNSPECIFIED ERECTILE DYSFUNCTION TYPE: ICD-10-CM

## 2024-03-08 RX ORDER — TADALAFIL 5 MG/1
5 TABLET ORAL DAILY PRN
Qty: 10 TABLET | Refills: 0 | Status: SHIPPED | OUTPATIENT
Start: 2024-03-08

## 2024-03-19 NOTE — PROGRESS NOTES
INTERNAL MEDICINE PRE-OPERATIVE EVALUATION  [facility] PHYSICIAN [facility] - [facility] INTERNAL MEDICINE [place]    NAME: Jimbo Suresh  AGE: 73 y.o. SEX: male  : 1950     DATE: 3/19/2024     Internal Medicine Pre-Operative Evaluation:     Chief Complaint: Pre-operative Evaluation     Surgery: Cataract   Anticipated Date of Surgery:   Referring Provider: No ref. provider found  Jocelin Oneill MD      History of Present Illness:     Jimbo Suresh is a 73 y.o. male who presents to the office today for a preoperative consultation at the request of surgeon, Jocelin Aguero MD, who plans on performing cataract surgery on . Planned anesthesia is MAC. Patient has a bleeding risk of: no recent abnormal bleeding. Patient does not have objections to receiving blood products if needed. Current anti-platelet/anti-coagulation medications that the patient is prescribed includes:  None .   Chief Complaint: Pre-operative Evaluation  Surgery: cataract surgery  Anticipated Date of Surgery:    Referring Provider: Jocelin Oneill MD   Assessment of Chronic Conditions:   - Hypertension:     - Hypertension: Continue amlodipine 10 mg PO daily.  - HLD: Continue atorvastatin 40 mg daily  Assessment of Cardiac Risk:Assessment of Cardiac Risk:  Denies unstable or severe angina or MI in the last 6 weeks or history of stent placement in the last year   Denies decompensated heart failure (e.g. New onset heart failure, NYHA functional class IV heart failure, or worsening existing heart failure)  Denies significant arrhythmias such as high grade AV block, symptomatic ventricular arrhythmia, newly recognized ventricular tachycardia, supraventricular tachycardia with resting heart rate >100, or symptomatic bradycardia  Denies severe heart valve disease including aortic stenosis or symptomatic mitral stenosis     Exercise Capacity:  Able to walk 4 blocks without symptoms?: Yes,   Able to walk 2 flights without symptoms?: Yes    Prior  Anesthesia Reactions: No     Personal history of venous thromboembolic disease? No    History of steroid use for >2 weeks within last year? No    STOP-BANG Sleep Apnea Screening Questionnaire:       Do you SNORE loudly (louder than talking or loud enough to be heard through closed doors)? No = 0 point   Do you often feel TIRED, fatigued, or sleepy during daytime? No = 0 point   Has anyone OBSERVED you stop breathing during your sleep? No = 0 point   Do you have or are you being treated for high blood pressure? Yes = 1 point   BMI more than 35 kg/m2? No = 0 point   AGE over 50 years old? Yes = 1 point   NECK circumference > 16 inches (40 cm)? No = 0 point   Male GENDER? Yes = 1 point   TOTAL SCORE 3 = INTERMEDIATE risk of CHRISTIAN       Review of Systems:     Review of Systems   Constitutional:  Negative for unexpected weight change.   HENT: Negative.     Eyes:  Positive for visual disturbance.   Respiratory: Negative.     Cardiovascular: Negative.    Gastrointestinal: Negative.    Genitourinary: Negative.    Musculoskeletal: Negative.    Skin: Negative.    Neurological: Negative.         Problem List:     Patient Active Problem List   Diagnosis   • BPH associated with nocturia   • Osteoarthritis of both hands   • Essential hypertension   • Erectile dysfunction   • Blurry vision, bilateral   • Hyperlipidemia   • Joint swelling   • Age-related cataract of both eyes        Allergies:     No Known Allergies     Current Medications:       Current Outpatient Medications:   •  amLODIPine (NORVASC) 10 mg tablet, Take 1 tablet (10 mg total) by mouth daily, Disp: 90 tablet, Rfl: 1  •  atorvastatin (LIPITOR) 40 mg tablet, Take 1 tablet (40 mg total) by mouth daily, Disp: 90 tablet, Rfl: 1  •  tadalafil (CIALIS) 5 MG tablet, Take 1 tablet (5 mg total) by mouth daily as needed for erectile dysfunction, Disp: 10 tablet, Rfl: 0  •  terazosin (HYTRIN) 2 mg capsule, Take 1 capsule (2 mg total) by mouth daily at bedtime, Disp: 30 capsule,  Rfl: 11     Past History:     Past Medical History:   Diagnosis Date   • BPH (benign prostatic hyperplasia)    • Erectile dysfunction    • Hypertension    • Medical history unknown         Past Surgical History:   Procedure Laterality Date   • HERNIA REPAIR      Last Assessed: 12/6/2017        Family History   Problem Relation Age of Onset   • Parkinsonism Mother         Symptomatic Parkinson Disease    • Coronary artery disease Father    • Heart attack Father         Social History     Socioeconomic History   • Marital status: Single     Spouse name: Not on file   • Number of children: Not on file   • Years of education: Not on file   • Highest education level: Not on file   Occupational History   • Not on file   Tobacco Use   • Smoking status: Former   • Smokeless tobacco: Never   Vaping Use   • Vaping status: Never Used   Substance and Sexual Activity   • Alcohol use: Yes     Comment: Social Drinker    • Drug use: No   • Sexual activity: Not on file   Other Topics Concern   • Not on file   Social History Narrative   • Not on file     Social Determinants of Health     Financial Resource Strain: Low Risk  (1/20/2023)    Overall Financial Resource Strain (CARDIA)    • Difficulty of Paying Living Expenses: Not very hard   Food Insecurity: No Food Insecurity (1/20/2023)    Hunger Vital Sign    • Worried About Running Out of Food in the Last Year: Never true    • Ran Out of Food in the Last Year: Never true   Transportation Needs: No Transportation Needs (1/20/2023)    PRAPARE - Transportation    • Lack of Transportation (Medical): No    • Lack of Transportation (Non-Medical): No   Physical Activity: Inactive (7/22/2021)    Exercise Vital Sign    • Days of Exercise per Week: 0 days    • Minutes of Exercise per Session: 0 min   Stress: Not on file   Social Connections: Not on file   Intimate Partner Violence: Not on file   Housing Stability: Low Risk  (4/7/2022)    Housing Stability Vital Sign    • Unable to Pay for  Housing in the Last Year: No    • Number of Places Lived in the Last Year: 1    • Unstable Housing in the Last Year: No        Physical Exam:      There were no vitals taken for this visit.    Physical Exam  Constitutional:       General: He is not in acute distress.     Appearance: Normal appearance. He is normal weight. He is not ill-appearing, toxic-appearing or diaphoretic.   HENT:      Head: Normocephalic and atraumatic.      Nose: Nose normal. No congestion or rhinorrhea.      Mouth/Throat:      Mouth: Mucous membranes are moist.      Pharynx: Oropharynx is clear.   Eyes:      General: No scleral icterus.        Right eye: No discharge.         Left eye: No discharge.      Conjunctiva/sclera: Conjunctivae normal.   Cardiovascular:      Rate and Rhythm: Normal rate and regular rhythm.      Pulses: Normal pulses.      Heart sounds: Normal heart sounds.   Pulmonary:      Effort: Pulmonary effort is normal. No respiratory distress.      Breath sounds: Normal breath sounds. No stridor. No wheezing, rhonchi or rales.   Abdominal:      General: Abdomen is flat. Bowel sounds are normal. There is no distension.      Palpations: Abdomen is soft.      Tenderness: There is no abdominal tenderness. There is no guarding or rebound.   Musculoskeletal:         General: Normal range of motion.      Cervical back: Normal range of motion and neck supple. No rigidity or tenderness.   Lymphadenopathy:      Cervical: No cervical adenopathy.   Skin:     General: Skin is warm and dry.      Capillary Refill: Capillary refill takes less than 2 seconds.      Coloration: Skin is not jaundiced.   Neurological:      Mental Status: He is alert and oriented to person, place, and time.   Psychiatric:         Mood and Affect: Mood normal.         Data:     Pre-operative work-up    Laboratory Results: I have personally reviewed the pertinent laboratory results/reports     EKG:  None, no indication     Chest x-ray:  None, no indication      Previous cardiopulmonary studies within the past year:  Echocardiogram: None  Cardiac Catheterization: None  Stress Test: None  Pulmonary Function Testing: None       Assessment:     No diagnosis found.     Plan:     73 y.o. male with planned surgery: 73 y.o. male with planned surgery: bilateral cataract surgery. No medical contraindications to cataract surgery at time of this assessment.       Cardiac Risk Estimation: per the Revised Cardiac Risk Index (Circ. 100:1043, 1999), the patient's risk factors for cardiac complications include  no risk factors , putting him in: RCI RISK CLASS I (0 risk factors, risk of major cardiac compl. appr. 0.5%).    1. Further preoperative workup as follows:   - None; no further preoperative work-up is required    2. Medication Management/Recommendations:   - None, continue medication regimen including morning of surgery, with sip of water    3. Prophylaxis for cardiac events with perioperative beta-blockers: not indicated.    4. Patient requires further consultation with: None    Clearance  There is no medical contraindication to cataract surgery at the time of this evaluation. Pt is medically suitable for the planned procedures.        Tobacco and Toxic Substance Assessment and Intervention:     Tobacco use screening performed       Andrew Ontiveros MD  [facility] INTERNAL MEDICINE [address] [address]  Phone#  [phone]  Fax#  [phone]

## 2024-03-22 ENCOUNTER — CONSULT (OUTPATIENT)
Dept: INTERNAL MEDICINE CLINIC | Facility: CLINIC | Age: 74
End: 2024-03-22
Payer: MEDICARE

## 2024-03-22 VITALS
WEIGHT: 159 LBS | DIASTOLIC BLOOD PRESSURE: 74 MMHG | BODY MASS INDEX: 25.55 KG/M2 | HEART RATE: 88 BPM | OXYGEN SATURATION: 97 % | RESPIRATION RATE: 16 BRPM | SYSTOLIC BLOOD PRESSURE: 120 MMHG | TEMPERATURE: 99 F | HEIGHT: 66 IN

## 2024-03-22 DIAGNOSIS — N40.1 BPH ASSOCIATED WITH NOCTURIA: ICD-10-CM

## 2024-03-22 DIAGNOSIS — I10 ESSENTIAL HYPERTENSION: ICD-10-CM

## 2024-03-22 DIAGNOSIS — H26.9 CATARACT OF BOTH EYES, UNSPECIFIED CATARACT TYPE: ICD-10-CM

## 2024-03-22 DIAGNOSIS — Z01.818 PRE-OP EXAMINATION: Primary | ICD-10-CM

## 2024-03-22 DIAGNOSIS — R35.1 BPH ASSOCIATED WITH NOCTURIA: ICD-10-CM

## 2024-03-22 PROCEDURE — 99213 OFFICE O/P EST LOW 20 MIN: CPT

## 2024-03-22 RX ORDER — TERAZOSIN 2 MG/1
2 CAPSULE ORAL
Qty: 30 CAPSULE | Refills: 11 | Status: SHIPPED | OUTPATIENT
Start: 2024-03-22

## 2024-03-28 ENCOUNTER — TELEPHONE (OUTPATIENT)
Dept: ADMINISTRATIVE | Facility: OTHER | Age: 74
End: 2024-03-28

## 2024-03-28 NOTE — TELEPHONE ENCOUNTER
Upon review of the In Basket request we were able to locate, review, and update the patient chart as requested for CRC: Colonoscopy.    Any additional questions or concerns should be emailed to the Practice Liaisons via the appropriate education email address, please do not reply via In Basket.    Thank you  Christin Damico

## 2024-03-28 NOTE — TELEPHONE ENCOUNTER
----- Message from Laurita Matthews sent at 3/28/2024 10:29 AM EDT -----  Regarding: Care Gap Request  03/28/24 10:29 AM    Hello, our patient attached above has had CRC: Colonoscopy completed/performed. Please assist in updating the patient chart by pulling a previous Electronic Medical Record (EMR) document. The previous EMR is [facility]. The date of service is 12/8/23    Thank you,  Laurita Matthews   INTERNAL MED [place]   This was done at [facility]

## 2024-05-06 ENCOUNTER — OFFICE VISIT (OUTPATIENT)
Dept: INTERNAL MEDICINE CLINIC | Facility: CLINIC | Age: 74
End: 2024-05-06
Payer: MEDICARE

## 2024-05-06 VITALS
BODY MASS INDEX: 26.2 KG/M2 | OXYGEN SATURATION: 97 % | HEART RATE: 116 BPM | WEIGHT: 163 LBS | HEIGHT: 66 IN | TEMPERATURE: 99.2 F | SYSTOLIC BLOOD PRESSURE: 120 MMHG | DIASTOLIC BLOOD PRESSURE: 76 MMHG | RESPIRATION RATE: 16 BRPM

## 2024-05-06 DIAGNOSIS — J30.2 SEASONAL ALLERGIES: ICD-10-CM

## 2024-05-06 DIAGNOSIS — M54.41 ACUTE RIGHT-SIDED LOW BACK PAIN WITH RIGHT-SIDED SCIATICA: Primary | ICD-10-CM

## 2024-05-06 DIAGNOSIS — N40.1 BPH ASSOCIATED WITH NOCTURIA: ICD-10-CM

## 2024-05-06 DIAGNOSIS — N52.9 ERECTILE DYSFUNCTION, UNSPECIFIED ERECTILE DYSFUNCTION TYPE: ICD-10-CM

## 2024-05-06 DIAGNOSIS — I10 ESSENTIAL HYPERTENSION: ICD-10-CM

## 2024-05-06 DIAGNOSIS — R35.1 BPH ASSOCIATED WITH NOCTURIA: ICD-10-CM

## 2024-05-06 DIAGNOSIS — R06.83 SNORING: ICD-10-CM

## 2024-05-06 DIAGNOSIS — E78.5 HYPERLIPIDEMIA: ICD-10-CM

## 2024-05-06 PROBLEM — H53.8 BLURRY VISION, BILATERAL: Status: RESOLVED | Noted: 2023-10-24 | Resolved: 2024-05-06

## 2024-05-06 PROBLEM — H25.9 AGE-RELATED CATARACT OF BOTH EYES: Status: RESOLVED | Noted: 2024-02-02 | Resolved: 2024-05-06

## 2024-05-06 PROBLEM — H26.9 CATARACT: Status: RESOLVED | Noted: 2024-03-22 | Resolved: 2024-05-06

## 2024-05-06 PROCEDURE — 99214 OFFICE O/P EST MOD 30 MIN: CPT | Performed by: INTERNAL MEDICINE

## 2024-05-06 RX ORDER — MELOXICAM 15 MG/1
15 TABLET ORAL DAILY PRN
Qty: 14 TABLET | Refills: 0 | Status: SHIPPED | OUTPATIENT
Start: 2024-05-06

## 2024-05-06 RX ORDER — CETIRIZINE HYDROCHLORIDE 10 MG/1
10 TABLET, CHEWABLE ORAL DAILY
Qty: 90 TABLET | Refills: 0 | Status: SHIPPED | OUTPATIENT
Start: 2024-05-06

## 2024-05-06 RX ORDER — BACLOFEN 10 MG/1
10 TABLET ORAL
Qty: 14 TABLET | Refills: 0 | Status: SHIPPED | OUTPATIENT
Start: 2024-05-06

## 2024-05-06 RX ORDER — POLYMYXIN B SULFATE AND TRIMETHOPRIM 1; 10000 MG/ML; [USP'U]/ML
SOLUTION OPHTHALMIC
COMMUNITY
Start: 2024-04-02

## 2024-05-06 RX ORDER — TADALAFIL 5 MG/1
5 TABLET ORAL DAILY PRN
Qty: 10 TABLET | Refills: 0 | Status: SHIPPED | OUTPATIENT
Start: 2024-05-06

## 2024-05-06 RX ORDER — PREDNISOLONE ACETATE 10 MG/ML
SUSPENSION/ DROPS OPHTHALMIC
COMMUNITY
Start: 2024-04-02

## 2024-05-06 RX ORDER — TERAZOSIN 2 MG/1
2 CAPSULE ORAL
Qty: 90 CAPSULE | Refills: 1 | Status: SHIPPED | OUTPATIENT
Start: 2024-05-06

## 2024-05-06 NOTE — PROGRESS NOTES
Assessment/Plan:    Essential hypertension  Well controlled on terazosin and norvasc.    Snoring  With associated hx of HTN will screen for CHRISTIAN. Referral made.    Acute right-sided low back pain with right-sided sciatica  R lower back pain and R lower leg pain at level of L5 dermatome. Since he has pain and tenderness on the lateral aspect of thigh hip OA, tendonitis or bursitis cannot be excluded. Recommended symptomatic treatment for now with NSAIDs and muscle relaxant at night, since [place] is going [place] if no improvement to do PT when he returns.      Diagnoses and all orders for this visit:    Acute right-sided low back pain with right-sided sciatica  -     XR spine lumbar minimum 4 views non injury; Future  -     baclofen 10 mg tablet; Take 1 tablet (10 mg total) by mouth daily at bedtime  -     meloxicam (MOBIC) 15 mg tablet; Take 1 tablet (15 mg total) by mouth daily as needed for moderate pain    Essential hypertension  -     terazosin (HYTRIN) 2 mg capsule; Take 1 capsule (2 mg total) by mouth daily at bedtime  -     Ambulatory Referral to Sleep Medicine; Future    BPH associated with nocturia  -     terazosin (HYTRIN) 2 mg capsule; Take 1 capsule (2 mg total) by mouth daily at bedtime    Seasonal allergies  -     cetirizine (ZyrTEC) 10 MG chewable tablet; Chew 1 tablet (10 mg total) daily    Hyperlipidemia    Erectile dysfunction, unspecified erectile dysfunction type  -     tadalafil (CIALIS) 5 MG tablet; Take 1 tablet (5 mg total) by mouth daily as needed for erectile dysfunction    Snoring    Other orders  -     polymyxin b-trimethoprim (POLYTRIM) ophthalmic solution; INSTILL 1 DROP INTO AFFECTED EYE 4 TIMES DAILY AS DIRECTED (Patient not taking: Reported on 5/6/2024)  -     prednisoLONE acetate (PRED FORTE) 1 % ophthalmic suspension; INSTILL 1 DROP INTO AFFECTED EYE 4 TIMES DAILY AS DIRECTED (Patient not taking: Reported on 5/6/2024)          Subjective:      Patient ID: Jimbo  "Kerwin is a 73 y.o. male.    Patient presents in office for follow-up visit and with new complaints of right side low back pain radiating to the right leg on the lateral aspect.  Patient states that the pain has been ongoing for few weeks, he does work standing for 8 hours straight he does have some rotation movements while at work, the pain is aggravated after work and so far he has been doing some stretching exercise and using heat while he is showering to alleviate the pain.  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  He denies any weakness, numbness or tingling associated with it.  He did he visited a chiropractor but did not improve his pain.  He is going to travel in 1 week to [place] and he will stay there for about 2 months.  He is requesting his chronic medications to be refilled so he can have it while abroad.        The following portions of the patient's history were reviewed and updated as appropriate: allergies, current medications, past family history, past medical history, past social history, past surgical history, and problem list.    Review of Systems      Objective:      /76 (BP Location: Left arm, Patient Position: Sitting, Cuff Size: Adult)   Pulse (!) 116   Temp 99.2 °F (37.3 °C) (Tympanic)   Resp 16   Ht 5' 6\" (1.676 m)   Wt 73.9 kg (163 lb)   SpO2 97%   BMI 26.31 kg/m²          Physical Exam  Vitals and nursing note reviewed.   Constitutional:       General: He is not in acute distress.     Appearance: He is well-developed.   HENT:      Head: Normocephalic and atraumatic.   Eyes:      Conjunctiva/sclera: Conjunctivae normal.      Pupils: Pupils are equal, round, and reactive to light.   Neck:      Thyroid: No thyromegaly.   Cardiovascular:      Rate and Rhythm: Normal rate and regular rhythm.      Heart sounds: Normal heart sounds.   Pulmonary:      Effort: No respiratory distress.      Breath sounds: Normal breath sounds. No wheezing.   Abdominal:      " General: Bowel sounds are normal. There is no distension.      Palpations: Abdomen is soft.      Tenderness: There is no abdominal tenderness.   Musculoskeletal:         General: Normal range of motion.      Cervical back: Normal range of motion and neck supple.   Skin:     General: Skin is warm and dry.      Capillary Refill: Capillary refill takes less than 2 seconds.   Neurological:      Mental Status: He is alert and oriented to person, place, and time.      Sensory: No sensory deficit.      Motor: No weakness or abnormal muscle tone.      Gait: Gait normal.      Comments: Straight leg raise negative on the right side.  Point tenderness on the right leg lateral aspect, at the level of hip joint  No significant pain with internal, external rotation, flexion or extension of the right hip joint.   Psychiatric:         Thought Content: Thought content normal.         Judgment: Judgment normal.

## 2024-05-06 NOTE — ASSESSMENT & PLAN NOTE
R lower back pain and R lower leg pain at level of L5 dermatome. Since he has pain and tenderness on the lateral aspect of thigh hip OA, tendonitis or bursitis cannot be excluded. Recommended symptomatic treatment for now with NSAIDs and muscle relaxant at night, since he is going to be abroad for 2 months if no improvement to do PT when he returns.

## 2024-07-09 DIAGNOSIS — I10 ESSENTIAL HYPERTENSION: ICD-10-CM

## 2024-07-09 DIAGNOSIS — E78.5 HYPERLIPIDEMIA: ICD-10-CM

## 2024-07-09 RX ORDER — ATORVASTATIN CALCIUM 40 MG/1
40 TABLET, FILM COATED ORAL DAILY
Qty: 100 TABLET | Refills: 1 | Status: SHIPPED | OUTPATIENT
Start: 2024-07-09

## 2024-07-09 RX ORDER — AMLODIPINE BESYLATE 10 MG/1
10 TABLET ORAL DAILY
Qty: 100 TABLET | Refills: 1 | Status: SHIPPED | OUTPATIENT
Start: 2024-07-09

## 2024-07-15 DIAGNOSIS — E78.5 HYPERLIPIDEMIA: ICD-10-CM

## 2024-07-15 RX ORDER — ATORVASTATIN CALCIUM 40 MG/1
40 TABLET, FILM COATED ORAL DAILY
Qty: 100 TABLET | Refills: 1 | Status: CANCELLED | OUTPATIENT
Start: 2024-07-15

## 2024-07-18 ENCOUNTER — OFFICE VISIT (OUTPATIENT)
Dept: INTERNAL MEDICINE CLINIC | Facility: CLINIC | Age: 74
End: 2024-07-18
Payer: MEDICARE

## 2024-07-18 VITALS
RESPIRATION RATE: 16 BRPM | BODY MASS INDEX: 26.49 KG/M2 | OXYGEN SATURATION: 98 % | DIASTOLIC BLOOD PRESSURE: 82 MMHG | SYSTOLIC BLOOD PRESSURE: 120 MMHG | HEART RATE: 106 BPM | TEMPERATURE: 98.5 F | HEIGHT: 65 IN | WEIGHT: 159 LBS

## 2024-07-18 DIAGNOSIS — R73.01 IMPAIRED FASTING GLUCOSE: ICD-10-CM

## 2024-07-18 DIAGNOSIS — R31.29 MICROSCOPIC HEMATURIA: ICD-10-CM

## 2024-07-18 DIAGNOSIS — N52.9 ERECTILE DYSFUNCTION, UNSPECIFIED ERECTILE DYSFUNCTION TYPE: ICD-10-CM

## 2024-07-18 DIAGNOSIS — M25.551 RIGHT HIP PAIN: Primary | ICD-10-CM

## 2024-07-18 PROCEDURE — 99214 OFFICE O/P EST MOD 30 MIN: CPT | Performed by: INTERNAL MEDICINE

## 2024-07-18 RX ORDER — METHYLPREDNISOLONE 4 MG/1
TABLET ORAL
Qty: 21 EACH | Refills: 0 | Status: SHIPPED | OUTPATIENT
Start: 2024-07-18

## 2024-07-18 RX ORDER — TADALAFIL 10 MG/1
10 TABLET ORAL DAILY PRN
Qty: 10 TABLET | Refills: 0 | Status: SHIPPED | OUTPATIENT
Start: 2024-07-18

## 2024-07-18 NOTE — PROGRESS NOTES
Ambulatory Visit  Name: Jimbo Suresh      : 1950      MRN: [id number]  Encounter Provider: Kallie Knight MD  Encounter Date: 2024   Encounter department: [facility] INTERNAL MEDICINE [place]    Assessment & Plan   1. Right hip pain  Assessment & Plan:  Differential includes osteoarthritis of the hip, bursitis, tendinitis.  Since he had minimal improvement with NSAIDs will trial Medrol pack referral to orthopedic given.  Orders:  -     Ambulatory Referral to Orthopedic Surgery; Future  -     methylPREDNISolone 4 MG tablet therapy pack; Use as directed on package  2. Microscopic hematuria  Assessment & Plan:  Prior urinalysis showing trace blood, will repeat UA.  Orders:  -     Urinalysis with microscopic; Future  3. Impaired fasting glucose  Assessment & Plan:  Check A1c.  Orders:  -     Hemoglobin A1C; Future  4. Erectile dysfunction, unspecified erectile dysfunction type  -     tadalafil (CIALIS) 10 MG tablet; Take 1 tablet (10 mg total) by mouth daily as needed for erectile dysfunction      Follow-up in 1 month for annual physical examination.     History of Present Illness     Patient presents in office for follow-up visit in regards to her right flank pain.  Prior visit few months ago he complaining of low back pain with radiation to the right side but now he complains only on pain on the right hip on the lateral aspect and with external rotation of his right hip.  He was traveling in [place] for 2 months, while he was there he used NSAIDs and, Tylenol and muscle relaxant with some relief, when he was out of medication the pain persisted.  The pain is present in the morning and throughout the day is worse with movement better when he is sitting still he has no pain.  He also went to urgent care in [place] and was treated with IV medication which relieved his pain temporarily.  He is currently not taking anything for the pain.  He denies any weakness or numbness.  Never seen orthopedics before  for the reason.        Review of Systems   Constitutional:  Negative for appetite change and fatigue.   Eyes:  Negative for visual disturbance.   Respiratory:  Negative for cough, chest tightness, shortness of breath and wheezing.    Cardiovascular:  Negative for chest pain, palpitations and leg swelling.   Gastrointestinal:  Negative for abdominal pain, nausea and vomiting.   Genitourinary:  Negative for difficulty urinating and frequency.   Musculoskeletal:  Positive for arthralgias. Negative for joint swelling.   Skin:  Negative for rash.   Neurological:  Negative for dizziness and headaches.   Psychiatric/Behavioral:  Negative for confusion and sleep disturbance.      Current Outpatient Medications on File Prior to Visit   Medication Sig Dispense Refill    amLODIPine (NORVASC) 10 mg tablet Take 1 tablet by mouth once daily 100 tablet 1    atorvastatin (LIPITOR) 40 mg tablet Take 1 tablet by mouth once daily 100 tablet 1    cetirizine (ZyrTEC) 10 MG chewable tablet Chew 1 tablet (10 mg total) daily 90 tablet 0    terazosin (HYTRIN) 2 mg capsule Take 1 capsule (2 mg total) by mouth daily at bedtime 90 capsule 1    [DISCONTINUED] baclofen 10 mg tablet Take 1 tablet (10 mg total) by mouth daily at bedtime 14 tablet 0    [DISCONTINUED] meloxicam (MOBIC) 15 mg tablet Take 1 tablet (15 mg total) by mouth daily as needed for moderate pain 14 tablet 0    [DISCONTINUED] tadalafil (CIALIS) 5 MG tablet Take 1 tablet (5 mg total) by mouth daily as needed for erectile dysfunction 10 tablet 0    [DISCONTINUED] polymyxin b-trimethoprim (POLYTRIM) ophthalmic solution INSTILL 1 DROP INTO AFFECTED EYE 4 TIMES DAILY AS DIRECTED (Patient not taking: Reported on 5/6/2024)      [DISCONTINUED] prednisoLONE acetate (PRED FORTE) 1 % ophthalmic suspension INSTILL 1 DROP INTO AFFECTED EYE 4 TIMES DAILY AS DIRECTED (Patient not taking: Reported on 5/6/2024)       No current facility-administered medications on file prior to visit.     "  Social History     Tobacco Use    Smoking status: Former    Smokeless tobacco: Never   Vaping Use    Vaping status: Never Used   Substance and Sexual Activity    Alcohol use: Yes     Comment: Social Drinker     Drug use: No    Sexual activity: Not on file     Objective     /82 (BP Location: Left arm, Patient Position: Sitting, Cuff Size: Adult)   Pulse (!) 106   Temp 98.5 °F (36.9 °C) (Tympanic)   Resp 16   Ht 5' 5\" (1.651 m)   Wt 72.1 kg (159 lb)   SpO2 98%   BMI 26.46 kg/m²     Physical Exam  Vitals and nursing note reviewed.   Constitutional:       General: He is not in acute distress.     Appearance: Normal appearance.   HENT:      Head: Normocephalic and atraumatic.   Eyes:      Extraocular Movements: Extraocular movements intact.      Pupils: Pupils are equal, round, and reactive to light.   Cardiovascular:      Rate and Rhythm: Regular rhythm. Tachycardia present.      Pulses: Normal pulses.      Heart sounds: No murmur heard.  Pulmonary:      Effort: Pulmonary effort is normal.      Breath sounds: Normal breath sounds. No wheezing or rhonchi.   Musculoskeletal:         General: No swelling. Normal range of motion.      Cervical back: Normal range of motion and neck supple.      Right lower leg: No edema.      Left lower leg: No edema.      Comments: Tenderness to palpation on lateral aspect of right hip, normal ROM with mild pain on external rotation of r hip   Skin:     General: Skin is warm and dry.      Capillary Refill: Capillary refill takes less than 2 seconds.   Neurological:      General: No focal deficit present.      Mental Status: He is alert and oriented to person, place, and time.   Psychiatric:         Behavior: Behavior normal.         Thought Content: Thought content normal.       Administrative Statements           "

## 2024-07-18 NOTE — ASSESSMENT & PLAN NOTE
Differential includes osteoarthritis of the hip, bursitis, tendinitis.  Since he had minimal improvement with NSAIDs will trial Medrol pack referral to orthopedic given.

## 2024-07-23 ENCOUNTER — APPOINTMENT (OUTPATIENT)
Dept: LAB | Facility: AMBULARY SURGERY CENTER | Age: 74
End: 2024-07-23
Payer: MEDICARE

## 2024-07-23 DIAGNOSIS — R73.01 IMPAIRED FASTING GLUCOSE: ICD-10-CM

## 2024-07-23 DIAGNOSIS — R31.29 MICROSCOPIC HEMATURIA: ICD-10-CM

## 2024-07-23 DIAGNOSIS — E78.5 HYPERLIPIDEMIA, UNSPECIFIED HYPERLIPIDEMIA TYPE: ICD-10-CM

## 2024-07-23 DIAGNOSIS — I10 ESSENTIAL HYPERTENSION: ICD-10-CM

## 2024-07-23 LAB
ALBUMIN SERPL BCG-MCNC: 4.1 G/DL (ref 3.5–5)
ALP SERPL-CCNC: 55 U/L (ref 34–104)
ALT SERPL W P-5'-P-CCNC: 18 U/L (ref 7–52)
ANION GAP SERPL CALCULATED.3IONS-SCNC: 9 MMOL/L (ref 4–13)
AST SERPL W P-5'-P-CCNC: 16 U/L (ref 13–39)
BACTERIA UR QL AUTO: NORMAL /HPF
BILIRUB SERPL-MCNC: 0.29 MG/DL (ref 0.2–1)
BILIRUB UR QL STRIP: NEGATIVE
BUN SERPL-MCNC: 10 MG/DL (ref 5–25)
CALCIUM SERPL-MCNC: 9.8 MG/DL (ref 8.4–10.2)
CHLORIDE SERPL-SCNC: 104 MMOL/L (ref 96–108)
CHOLEST SERPL-MCNC: 136 MG/DL
CLARITY UR: CLEAR
CO2 SERPL-SCNC: 24 MMOL/L (ref 21–32)
COLOR UR: NORMAL
CREAT SERPL-MCNC: 0.59 MG/DL (ref 0.6–1.3)
ERYTHROCYTE [DISTWIDTH] IN BLOOD BY AUTOMATED COUNT: 13.7 % (ref 11.6–15.1)
EST. AVERAGE GLUCOSE BLD GHB EST-MCNC: 114 MG/DL
GFR SERPL CREATININE-BSD FRML MDRD: 101 ML/MIN/1.73SQ M
GLUCOSE P FAST SERPL-MCNC: 90 MG/DL (ref 65–99)
GLUCOSE UR STRIP-MCNC: NEGATIVE MG/DL
HBA1C MFR BLD: 5.6 %
HCT VFR BLD AUTO: 46.6 % (ref 36.5–49.3)
HDLC SERPL-MCNC: 65 MG/DL
HGB BLD-MCNC: 14.6 G/DL (ref 12–17)
HGB UR QL STRIP.AUTO: NEGATIVE
KETONES UR STRIP-MCNC: NEGATIVE MG/DL
LDLC SERPL CALC-MCNC: 50 MG/DL (ref 0–100)
LEUKOCYTE ESTERASE UR QL STRIP: NEGATIVE
MCH RBC QN AUTO: 26.6 PG (ref 26.8–34.3)
MCHC RBC AUTO-ENTMCNC: 31.3 G/DL (ref 31.4–37.4)
MCV RBC AUTO: 85 FL (ref 82–98)
NITRITE UR QL STRIP: NEGATIVE
NON-SQ EPI CELLS URNS QL MICRO: NORMAL /HPF
PH UR STRIP.AUTO: 6 [PH]
PLATELET # BLD AUTO: 320 THOUSANDS/UL (ref 149–390)
PMV BLD AUTO: 10.8 FL (ref 8.9–12.7)
POTASSIUM SERPL-SCNC: 4.2 MMOL/L (ref 3.5–5.3)
PROT SERPL-MCNC: 7.4 G/DL (ref 6.4–8.4)
PROT UR STRIP-MCNC: NEGATIVE MG/DL
RBC # BLD AUTO: 5.48 MILLION/UL (ref 3.88–5.62)
RBC #/AREA URNS AUTO: NORMAL /HPF
SODIUM SERPL-SCNC: 137 MMOL/L (ref 135–147)
SP GR UR STRIP.AUTO: 1.01 (ref 1–1.03)
TRIGL SERPL-MCNC: 103 MG/DL
UROBILINOGEN UR STRIP-ACNC: <2 MG/DL
WBC # BLD AUTO: 10.39 THOUSAND/UL (ref 4.31–10.16)
WBC #/AREA URNS AUTO: NORMAL /HPF

## 2024-07-23 PROCEDURE — 36415 COLL VENOUS BLD VENIPUNCTURE: CPT

## 2024-07-23 PROCEDURE — 83036 HEMOGLOBIN GLYCOSYLATED A1C: CPT

## 2024-07-23 PROCEDURE — 80061 LIPID PANEL: CPT

## 2024-07-23 PROCEDURE — 80053 COMPREHEN METABOLIC PANEL: CPT

## 2024-07-23 PROCEDURE — 85027 COMPLETE CBC AUTOMATED: CPT

## 2024-08-09 ENCOUNTER — APPOINTMENT (OUTPATIENT)
Dept: RADIOLOGY | Facility: OTHER | Age: 74
End: 2024-08-09
Payer: MEDICARE

## 2024-08-09 ENCOUNTER — OFFICE VISIT (OUTPATIENT)
Dept: OBGYN CLINIC | Facility: OTHER | Age: 74
End: 2024-08-09
Payer: MEDICARE

## 2024-08-09 VITALS
DIASTOLIC BLOOD PRESSURE: 83 MMHG | HEART RATE: 82 BPM | HEIGHT: 65 IN | BODY MASS INDEX: 26.79 KG/M2 | WEIGHT: 160.8 LBS | SYSTOLIC BLOOD PRESSURE: 142 MMHG

## 2024-08-09 DIAGNOSIS — M25.551 RIGHT HIP PAIN: ICD-10-CM

## 2024-08-09 DIAGNOSIS — M16.11 PRIMARY OSTEOARTHRITIS OF ONE HIP, RIGHT: Primary | ICD-10-CM

## 2024-08-09 PROCEDURE — 99203 OFFICE O/P NEW LOW 30 MIN: CPT | Performed by: ORTHOPAEDIC SURGERY

## 2024-08-09 PROCEDURE — 73502 X-RAY EXAM HIP UNI 2-3 VIEWS: CPT

## 2024-08-09 NOTE — PROGRESS NOTES
Assessment:       1. Primary osteoarthritis of one hip, right    2. Right hip pain          Plan:        I explained my current clinical findings and reviewed the radiological findings with the patient.  This is consistent with mild right hip degenerative arthritis.  Patient has been trying over-the-counter pain medications with limited relief.  He is agreeable to a trial of ultrasound-guided right hip intra-articular corticosteroid injection for which he will be accordingly scheduled.            Subjective:     Patient ID: Jimbo Suresh is a 73 y.o. male.    Chief Complaint:    HPI  Patient presents today for evaluation of right hip pain.  He has been referred in this regard by Dr. Kallie Knight MD.  Patient presents right anterolateral hip pain that radiates to his right anterior hip and groin area.  Symptoms present for several months but aggravated over the last month.  Nontraumatic onset of symptoms.  Occasionally the pain will radiate to his right thigh.  Symptoms are made worse with prolonged ambulation or trying to sit crosslegged.  Has been taking over-the-counter pain medications with limited relief.    Past Medical History:   Diagnosis Date    Age-related cataract of both eyes 02/02/2024    Blurry vision, bilateral 10/24/2023    BPH (benign prostatic hyperplasia)     Cataract 03/22/2024    Erectile dysfunction     Hypertension     Medical history unknown      Past Surgical History:   Procedure Laterality Date    HERNIA REPAIR      Last Assessed: 12/6/2017     No Known Allergies  Current Outpatient Medications on File Prior to Visit   Medication Sig Dispense Refill    amLODIPine (NORVASC) 10 mg tablet Take 1 tablet by mouth once daily 100 tablet 1    atorvastatin (LIPITOR) 40 mg tablet Take 1 tablet by mouth once daily 100 tablet 1    cetirizine (ZyrTEC) 10 MG chewable tablet Chew 1 tablet (10 mg total) daily 90 tablet 0    tadalafil (CIALIS) 10 MG tablet Take 1 tablet (10 mg total) by mouth daily  as needed for erectile dysfunction 10 tablet 0    terazosin (HYTRIN) 2 mg capsule Take 1 capsule (2 mg total) by mouth daily at bedtime 90 capsule 1    methylPREDNISolone 4 MG tablet therapy pack Use as directed on package (Patient not taking: Reported on 8/9/2024) 21 each 0     No current facility-administered medications on file prior to visit.          Social History     Occupational History    Not on file   Tobacco Use    Smoking status: Former    Smokeless tobacco: Never   Vaping Use    Vaping status: Never Used   Substance and Sexual Activity    Alcohol use: Yes     Comment: Social Drinker     Drug use: No    Sexual activity: Not on file      Review of Systems        Objective:     Right Hip Exam     Tenderness   The patient is experiencing tenderness in the anterior.    Range of Motion   Flexion:  120   External rotation:  70   Internal rotation:  25     Muscle Strength   The patient has normal right hip strength.    Tests   NIKKI: positive    Comments:  Negative passive SLR.        Physical Exam  Vitals and nursing note reviewed.   Constitutional:       Appearance: Normal appearance. He is well-developed.   HENT:      Head: Normocephalic.   Cardiovascular:      Rate and Rhythm: Normal rate.   Pulmonary:      Effort: Pulmonary effort is normal. No respiratory distress.   Skin:     General: Skin is warm.      Findings: No erythema.   Neurological:      Mental Status: He is alert and oriented to person, place, and time.   Psychiatric:         Behavior: Behavior normal.         Thought Content: Thought content normal.         Judgment: Judgment normal.           I have personally reviewed pertinent films in PACS and my interpretation is plain radiograph of the pelvis and right hip performed today does not reveal any acute osseous abnormality.  Mild degenerative changes noted in the superolateral aspect of the right hip..

## 2024-08-12 ENCOUNTER — OFFICE VISIT (OUTPATIENT)
Dept: INTERNAL MEDICINE CLINIC | Facility: CLINIC | Age: 74
End: 2024-08-12
Payer: MEDICARE

## 2024-08-12 VITALS
TEMPERATURE: 98.2 F | SYSTOLIC BLOOD PRESSURE: 140 MMHG | HEIGHT: 65 IN | HEART RATE: 75 BPM | RESPIRATION RATE: 16 BRPM | BODY MASS INDEX: 26.82 KG/M2 | WEIGHT: 161 LBS | DIASTOLIC BLOOD PRESSURE: 80 MMHG | OXYGEN SATURATION: 97 %

## 2024-08-12 DIAGNOSIS — Z12.5 SCREENING FOR PROSTATE CANCER: ICD-10-CM

## 2024-08-12 DIAGNOSIS — Z00.00 ENCOUNTER FOR ANNUAL PHYSICAL EXAM: Primary | ICD-10-CM

## 2024-08-12 DIAGNOSIS — Z29.11 NEED FOR RSV IMMUNIZATION: ICD-10-CM

## 2024-08-12 DIAGNOSIS — R35.1 BPH ASSOCIATED WITH NOCTURIA: ICD-10-CM

## 2024-08-12 DIAGNOSIS — N40.1 BPH ASSOCIATED WITH NOCTURIA: ICD-10-CM

## 2024-08-12 PROCEDURE — 99397 PER PM REEVAL EST PAT 65+ YR: CPT

## 2024-08-12 RX ORDER — TERAZOSIN 2 MG/1
2 CAPSULE ORAL
Qty: 90 CAPSULE | Refills: 1 | Status: SHIPPED | OUTPATIENT
Start: 2024-08-12

## 2024-08-12 NOTE — ASSESSMENT & PLAN NOTE
Patient has been medically stable since the last visit  Discussed patient's labs including CBC, CMP and HbA1c  Discussed with the patient about importance of blood pressure management  Discussed with the patient regarding importance of diet and exercise  Patient is up-to-date with a screening  However patient is not up-to-date with his immunization including RSV

## 2024-08-12 NOTE — PROGRESS NOTES
Adult Annual Physical  Name: Jimbo Suresh      : 1950      MRN: [id number]  Encounter Provider: Kallie Knight MD  Encounter Date: 2024   Encounter department: [facility] INTER[facility] MEDIC[facility] [place]    Assessment & Plan   1. Encounter for annual physical exam  Assessment & Plan:  Patient has been medically stable since the last visit  Discussed patient's labs including CBC, CMP and HbA1c  Discussed with the patient about importance of blood pressure management  Discussed with the patient regarding importance of diet and exercise  Patient is up-to-date with a screening  However patient is not up-to-date with his immunization including RSV    2. Need for RSV immunization  Assessment & Plan:  Patient provided with information regarding importance of this vaccination  Patient informed about the benefits and the risks associated with the vaccination    PLAN:  RSVPreF3 Vac Recomb  Orders:  -     RSVPreF3 Vac Recomb Adjuvanted 120 MCG/0.5ML SUSR; Inject 0.5 mL into a muscle 1 (one) time for 1 dose  3. BPH associated with nocturia  -     terazosin (HYTRIN) 2 mg capsule; Take 1 capsule (2 mg total) by mouth daily at bedtime  4. Screening for prostate cancer  -     PSA, Total Screen; Future    Immunizations and preventive care screenings were discussed with patient today. Appropriate education was printed on patient's after visit summary.    Discussed risks and benefits of prostate cancer screening. We discussed the controversial history of PSA screening for prostate cancer in the [place] as well as the risk of over detection and over treatment of prostate cancer by way of PSA screening.  The patient understands that PSA blood testing is an imperfect way to screen for prostate cancer and that elevated PSA levels in the blood may also be caused by infection, inflammation, prostatic trauma or manipulation, urological procedures, or by benign prostatic enlargement.    The role of the digital rectal  examination in prostate cancer screening was also discussed and I discussed with him that there is large interobserver variability in the findings of digital rectal examination.    Counseling:  Alcohol/drug use: discussed moderation in alcohol intake, the recommendations for healthy alcohol use, and avoidance of illicit drug use.  Dental Health: discussed importance of regular tooth brushing, flossing, and dental visits.  Injury prevention: discussed safety/seat belts, safety helmets, smoke detectors, carbon dioxide detectors, and smoking near bedding or upholstery.  Sexual health: discussed sexually transmitted diseases, partner selection, use of condoms, avoidance of unintended pregnancy, and contraceptive alternatives.  Exercise: the importance of regular exercise/physical activity was discussed. Recommend exercise 3-5 times per week for at least 30 minutes.          History of Present Illness     Adult Annual Physical:  Patient presents for annual physical.     Diet and Physical Activity:  - Diet/Nutrition: well balanced diet.  - Exercise: walking.    General Health:  - Sleep: sleeps well.  - Hearing: normal hearing right ear and normal hearing left ear.  - Vision: wears glasses.  - Dental: regular dental visits.     Health:    - Urinary symptoms: none.     Review of Systems   Constitutional: Negative.    HENT: Negative.     Eyes: Negative.    Respiratory: Negative.     Cardiovascular: Negative.    Gastrointestinal: Negative.    Endocrine: Negative.    Genitourinary: Negative.    Musculoskeletal:  Positive for arthralgias.   Skin: Negative.    Neurological: Negative.    Hematological: Negative.    Psychiatric/Behavioral: Negative.       Current Outpatient Medications on File Prior to Visit   Medication Sig Dispense Refill    amLODIPine (NORVASC) 10 mg tablet Take 1 tablet by mouth once daily 100 tablet 1    atorvastatin (LIPITOR) 40 mg tablet Take 1 tablet by mouth once daily 100 tablet 1    cetirizine  "(ZyrTEC) 10 MG chewable tablet Chew 1 tablet (10 mg total) daily 90 tablet 0    tadalafil (CIALIS) 10 MG tablet Take 1 tablet (10 mg total) by mouth daily as needed for erectile dysfunction 10 tablet 0    [DISCONTINUED] terazosin (HYTRIN) 2 mg capsule Take 1 capsule (2 mg total) by mouth daily at bedtime 90 capsule 1    methylPREDNISolone 4 MG tablet therapy pack Use as directed on package (Patient not taking: Reported on 8/9/2024) 21 each 0     No current facility-administered medications on file prior to visit.        Objective     /80 (BP Location: Left arm, Patient Position: Sitting, Cuff Size: Adult)   Pulse 75   Temp 98.2 °F (36.8 °C) (Tympanic)   Resp 16   Ht 5' 5\" (1.651 m)   Wt 73 kg (161 lb)   SpO2 97%   BMI 26.79 kg/m²     Physical Exam  Constitutional:       Appearance: Normal appearance.   HENT:      Head: Normocephalic and atraumatic.      Right Ear: External ear normal.      Left Ear: External ear normal.      Nose: Nose normal.      Mouth/Throat:      Mouth: Mucous membranes are moist.      Pharynx: Oropharynx is clear.   Eyes:      Extraocular Movements: Extraocular movements intact.      Conjunctiva/sclera: Conjunctivae normal.      Pupils: Pupils are equal, round, and reactive to light.   Cardiovascular:      Rate and Rhythm: Normal rate and regular rhythm.      Pulses: Normal pulses.      Heart sounds: Normal heart sounds.   Pulmonary:      Effort: Pulmonary effort is normal.      Breath sounds: Normal breath sounds.   Abdominal:      General: Bowel sounds are normal.      Palpations: Abdomen is soft.   Musculoskeletal:         General: Normal range of motion.   Skin:     General: Skin is warm.      Capillary Refill: Capillary refill takes less than 2 seconds.   Neurological:      General: No focal deficit present.      Mental Status: He is alert and oriented to person, place, and time.       Administrative Statements   I have spent a total time of 30 minutes in caring for this " patient on the day of the visit/encounter including Obtaining or reviewing history  .

## 2024-08-12 NOTE — ASSESSMENT & PLAN NOTE
Patient provided with information regarding importance of this vaccination  Patient informed about the benefits and the risks associated with the vaccination    PLAN:  RSVPreF3 Vac Recomb

## 2024-08-13 ENCOUNTER — APPOINTMENT (OUTPATIENT)
Dept: LAB | Facility: AMBULARY SURGERY CENTER | Age: 74
End: 2024-08-13
Payer: MEDICARE

## 2024-08-13 DIAGNOSIS — Z12.5 SCREENING FOR PROSTATE CANCER: ICD-10-CM

## 2024-08-13 LAB — PSA SERPL-MCNC: 0.49 NG/ML (ref 0–4)

## 2024-08-13 PROCEDURE — G0103 PSA SCREENING: HCPCS

## 2024-08-13 PROCEDURE — 36415 COLL VENOUS BLD VENIPUNCTURE: CPT

## 2024-08-27 ENCOUNTER — TELEPHONE (OUTPATIENT)
Age: 74
End: 2024-08-27

## 2024-08-27 NOTE — TELEPHONE ENCOUNTER
Daughter returning call to r/s USGI of Right hip.  She can be reached at the number below.    Call back: [phone]

## 2024-08-27 NOTE — TELEPHONE ENCOUNTER
Caller: patient daughter Kallie     Doctor: Jay     Reason for call: needs to reschedule 8/30 US     Call back#: [phone]    Thank you

## 2024-09-06 ENCOUNTER — PROCEDURE VISIT (OUTPATIENT)
Dept: OBGYN CLINIC | Facility: OTHER | Age: 74
End: 2024-09-06

## 2024-09-06 VITALS
HEIGHT: 65 IN | WEIGHT: 160.94 LBS | BODY MASS INDEX: 26.81 KG/M2 | HEART RATE: 88 BPM | DIASTOLIC BLOOD PRESSURE: 83 MMHG | SYSTOLIC BLOOD PRESSURE: 134 MMHG

## 2024-09-06 DIAGNOSIS — M25.551 RIGHT HIP PAIN: Primary | ICD-10-CM

## 2024-09-06 DIAGNOSIS — M16.11 PRIMARY OSTEOARTHRITIS OF ONE HIP, RIGHT: ICD-10-CM

## 2024-09-06 NOTE — PROGRESS NOTES
Large joint arthrocentesis: R hip joint  Universal Protocol:  Procedure performed by consultant: Dr. Harsh Thompson.  Consent: Verbal consent obtained.  Risks and benefits: risks, benefits and alternatives were discussed  Consent given by: patient  Patient understanding: patient states understanding of the procedure being performed  Radiology Images displayed and confirmed. If images not available, report reviewed: imaging studies available  Required items: required blood products, implants, devices, and special equipment available  Patient identity confirmed: verbally with patient  Supporting Documentation  Indications: pain   Procedure Details  Location: hip - R hip joint  Preparation: Patient was prepped and draped in the usual sterile fashion  Needle size: 18 G  Ultrasound guidance: yes  Approach: Anterior Sagital Oblique.  Medication group details: 18 mL lidocaine 1 %; 80 mg triamcinolone acetonide 40 mg/mL.    Patient tolerance: patient tolerated the procedure well with no immediate complications  Dressing:  Sterile dressing applied

## 2024-09-19 DIAGNOSIS — N52.9 ERECTILE DYSFUNCTION, UNSPECIFIED ERECTILE DYSFUNCTION TYPE: ICD-10-CM

## 2024-09-19 RX ORDER — TADALAFIL 10 MG/1
10 TABLET ORAL DAILY PRN
Qty: 10 TABLET | Refills: 5 | Status: SHIPPED | OUTPATIENT
Start: 2024-09-19

## 2024-10-08 ENCOUNTER — TELEPHONE (OUTPATIENT)
Age: 74
End: 2024-10-08

## 2024-10-08 NOTE — TELEPHONE ENCOUNTER
Received call from patient's daughter regarding a New Patient appt for her father who has a lump growing behind his ear that is hurting. Scheduled New Patient appt for 12/5/2024 at 7:40 am for Dr Calzada in [place]. Provided address to daughter.     Daughter verbalized understanding.

## 2024-11-13 DIAGNOSIS — N52.9 ERECTILE DYSFUNCTION, UNSPECIFIED ERECTILE DYSFUNCTION TYPE: ICD-10-CM

## 2024-11-14 RX ORDER — TADALAFIL 10 MG/1
10 TABLET ORAL DAILY PRN
Qty: 10 TABLET | Refills: 0 | Status: SHIPPED | OUTPATIENT
Start: 2024-11-14

## 2024-11-19 ENCOUNTER — OFFICE VISIT (OUTPATIENT)
Dept: UROLOGY | Facility: AMBULATORY SURGERY CENTER | Age: 74
End: 2024-11-19

## 2024-11-19 VITALS
HEART RATE: 58 BPM | OXYGEN SATURATION: 98 % | RESPIRATION RATE: 16 BRPM | DIASTOLIC BLOOD PRESSURE: 72 MMHG | SYSTOLIC BLOOD PRESSURE: 120 MMHG

## 2024-11-19 DIAGNOSIS — N52.9 ERECTILE DYSFUNCTION, UNSPECIFIED ERECTILE DYSFUNCTION TYPE: ICD-10-CM

## 2024-11-19 DIAGNOSIS — N40.1 BPH ASSOCIATED WITH NOCTURIA: Primary | ICD-10-CM

## 2024-11-19 DIAGNOSIS — Z12.5 SCREENING FOR PROSTATE CANCER: ICD-10-CM

## 2024-11-19 DIAGNOSIS — R35.1 BPH ASSOCIATED WITH NOCTURIA: Primary | ICD-10-CM

## 2024-11-19 LAB — POST-VOID RESIDUAL VOLUME, ML POC: 45 ML

## 2024-11-19 NOTE — ASSESSMENT & PLAN NOTE
PVR performed the office today was found to be 45 mL  Currently well-controlled on terazosin 2 mg daily for treatment of his lower urinary tract symptoms  Patient is content with his voiding pattern at this time and reports a strong urinary stream and is unbothered by his urinary frequency or urgency  Patient will continue on terazosin 2 mg daily and we will continue to monitor for worsening/progression of lower urinary tract symptoms

## 2024-11-19 NOTE — PROGRESS NOTES
11/19/2024      Assessment and Plan    74 y.o. male managed by our office    BPH associated with nocturia  PVR performed the office today was found to be 45 mL  Currently well-controlled on terazosin 2 mg daily for treatment of his lower urinary tract symptoms  Patient is content with his voiding pattern at this time and reports a strong urinary stream and is unbothered by his urinary frequency or urgency  Patient will continue on terazosin 2 mg daily and we will continue to monitor for worsening/progression of lower urinary tract symptoms    Erectile dysfunction  Currently well-controlled on Cialis 10 mg as needed 1 hour prior to sexual activity    Screening for prostate cancer  Denies a family history of prostate cancer  Patient's most recent PSA was performed 8/13/2024 and found to be 0.487.  Refer to PSA trend below.  We discussed that the patient can repeat his PSA in 1 year from his last with follow-up in the office at that time to undergo LAVINIA.    Lab Results   Component Value Date    PSA 0.487 08/13/2024    PSA 0.4 02/03/2023    PSA 0.4 01/18/2022            History of Present Illness  Jimbo Suresh is a 74 y.o. male here for evaluation of prostate cancer screening, BPH with urinary frequency/obstruction, and erectile dysfunction.  Patient was last seen in the office on 1/27/2023.  Patient remains on terazosin 2 mg daily for treatment of his lower urinary tract symptoms.  Patient is currently controlled on Cialis 10 mg as needed 1 hour prior to sexual activity for treatment of his lower tile dysfunction.  Patient previously denied a family history of prostate cancer.  Patient's last PSA was performed 8/13/2024 and found to be 0.487.  Today, the patient offers no changes in his lower urinary tract symptoms.  Patient states that he is currently well-controlled on the terazosin 2 mg daily and notes a strong urinary stream and is unbothered by his urinary frequency and urgency.  Patient notes that the Cialis 10  mg continues to work well for his erectile dysfunction.  Otherwise, the patient offers no new lower urinary tract complaints at today's office visit.        Review of Systems   Constitutional:  Negative for chills and fever.   HENT:  Negative for ear pain and sore throat.    Eyes:  Negative for pain and visual disturbance.   Respiratory:  Negative for cough and shortness of breath.    Cardiovascular:  Negative for chest pain and palpitations.   Gastrointestinal:  Negative for abdominal pain and vomiting.   Genitourinary:  Negative for decreased urine volume, difficulty urinating, dysuria, flank pain, frequency, hematuria and urgency.   Musculoskeletal:  Negative for arthralgias and back pain.   Skin:  Negative for color change and rash.   Neurological:  Negative for seizures and syncope.   All other systems reviewed and are negative.               Vitals  Vitals:    11/19/24 0941   BP: 120/72   BP Location: Left arm   Patient Position: Sitting   Cuff Size: Adult   Pulse: 58   Resp: 16   SpO2: 98%       Physical Exam  Vitals reviewed.   Constitutional:       General: He is not in acute distress.     Appearance: Normal appearance. He is not ill-appearing.   HENT:      Head: Normocephalic and atraumatic.      Nose: Nose normal.   Eyes:      General: No scleral icterus.  Pulmonary:      Effort: No respiratory distress.   Abdominal:      General: Abdomen is flat. There is no distension.      Palpations: Abdomen is soft.      Tenderness: There is no abdominal tenderness.   Musculoskeletal:         General: Normal range of motion.      Cervical back: Normal range of motion.   Skin:     General: Skin is warm.      Coloration: Skin is not jaundiced.   Neurological:      Mental Status: He is alert and oriented to person, place, and time.      Gait: Gait normal.   Psychiatric:         Mood and Affect: Mood normal.         Behavior: Behavior normal.           Past History  Past Medical History:   Diagnosis Date    Age-related  cataract of both eyes 02/02/2024    Blurry vision, bilateral 10/24/2023    BPH (benign prostatic hyperplasia)     Cataract 03/22/2024    Erectile dysfunction     Hypertension     Medical history unknown      Social History     Socioeconomic History    Marital status: /Civil Union     Spouse name: None    Number of children: None    Years of education: None    Highest education level: None   Occupational History    None   Tobacco Use    Smoking status: Former    Smokeless tobacco: Never   Vaping Use    Vaping status: Never Used   Substance and Sexual Activity    Alcohol use: Yes     Comment: Social Drinker     Drug use: No    Sexual activity: None   Other Topics Concern    None   Social History Narrative    None     Social Drivers of Health     Financial Resource Strain: Low Risk  (1/20/2023)    Overall Financial Resource Strain (CARDIA)     Difficulty of Paying Living Expenses: Not very hard   Food Insecurity: No Food Insecurity (1/20/2023)    Hunger Vital Sign     Worried About Running Out of Food in the Last Year: Never true     Ran Out of Food in the Last Year: Never true   Transportation Needs: No Transportation Needs (1/20/2023)    PRAPARE - Transportation     Lack of Transportation (Medical): No     Lack of Transportation (Non-Medical): No   Physical Activity: Inactive (7/22/2021)    Exercise Vital Sign     Days of Exercise per Week: 0 days     Minutes of Exercise per Session: 0 min   Stress: Not on file   Social Connections: Not on file   Intimate Partner Violence: Not on file   Housing Stability: Low Risk  (4/7/2022)    Housing Stability Vital Sign     Unable to Pay for Housing in the Last Year: No     Number of Places Lived in the Last Year: 1     Unstable Housing in the Last Year: No     Social History     Tobacco Use   Smoking Status Former   Smokeless Tobacco Never     Family History   Problem Relation Age of Onset    Parkinsonism Mother         Symptomatic Parkinson Disease     Coronary artery  disease Father     Heart attack Father        The following portions of the patient's history were reviewed and updated as appropriate: allergies, current medications, past medical history, past social history, past surgical history and problem list.    Results  No results found for this or any previous visit (from the past hour).]  Lab Results   Component Value Date    PSA 0.487 08/13/2024    PSA 0.4 02/03/2023    PSA 0.4 01/18/2022    PSA 0.6 03/04/2020     Lab Results   Component Value Date    CALCIUM 9.8 07/23/2024    K 4.2 07/23/2024    CO2 24 07/23/2024     07/23/2024    BUN 10 07/23/2024    CREATININE 0.59 (L) 07/23/2024     Lab Results   Component Value Date    WBC 10.39 (H) 07/23/2024    HGB 14.6 07/23/2024    HCT 46.6 07/23/2024    MCV 85 07/23/2024     07/23/2024

## 2024-11-19 NOTE — ASSESSMENT & PLAN NOTE
Denies a family history of prostate cancer  Patient's most recent PSA was performed 8/13/2024 and found to be 0.487.  Refer to PSA trend below.  We discussed that the patient can repeat his PSA in 1 year from his last with follow-up in the office at that time to undergo LAVINIA.    Lab Results   Component Value Date    PSA 0.487 08/13/2024    PSA 0.4 02/03/2023    PSA 0.4 01/18/2022

## 2024-12-04 ENCOUNTER — APPOINTMENT (OUTPATIENT)
Age: 74
End: 2024-12-04
Payer: MEDICARE

## 2024-12-04 ENCOUNTER — OFFICE VISIT (OUTPATIENT)
Age: 74
End: 2024-12-04
Payer: MEDICARE

## 2024-12-04 VITALS — TEMPERATURE: 97.4 F | WEIGHT: 166 LBS | BODY MASS INDEX: 27.62 KG/M2

## 2024-12-04 DIAGNOSIS — L60.9 NAIL LESION: ICD-10-CM

## 2024-12-04 DIAGNOSIS — L82.1 SEBORRHEIC KERATOSES: Primary | ICD-10-CM

## 2024-12-04 DIAGNOSIS — B35.6 TINEA CRURIS: ICD-10-CM

## 2024-12-04 LAB
ALBUMIN SERPL BCG-MCNC: 4.7 G/DL (ref 3.5–5)
ALP SERPL-CCNC: 55 U/L (ref 34–104)
ALT SERPL W P-5'-P-CCNC: 24 U/L (ref 7–52)
ANION GAP SERPL CALCULATED.3IONS-SCNC: 9 MMOL/L (ref 4–13)
AST SERPL W P-5'-P-CCNC: 19 U/L (ref 13–39)
BILIRUB SERPL-MCNC: 0.64 MG/DL (ref 0.2–1)
BUN SERPL-MCNC: 13 MG/DL (ref 5–25)
CALCIUM SERPL-MCNC: 9.9 MG/DL (ref 8.4–10.2)
CHLORIDE SERPL-SCNC: 101 MMOL/L (ref 96–108)
CO2 SERPL-SCNC: 27 MMOL/L (ref 21–32)
CREAT SERPL-MCNC: 0.76 MG/DL (ref 0.6–1.3)
GFR SERPL CREATININE-BSD FRML MDRD: 91 ML/MIN/1.73SQ M
GLUCOSE P FAST SERPL-MCNC: 95 MG/DL (ref 65–99)
POTASSIUM SERPL-SCNC: 4 MMOL/L (ref 3.5–5.3)
PROT SERPL-MCNC: 7.9 G/DL (ref 6.4–8.4)
SODIUM SERPL-SCNC: 137 MMOL/L (ref 135–147)

## 2024-12-04 PROCEDURE — 88312 SPECIAL STAINS GROUP 1: CPT | Performed by: STUDENT IN AN ORGANIZED HEALTH CARE EDUCATION/TRAINING PROGRAM

## 2024-12-04 PROCEDURE — 11755 BIOPSY NAIL UNIT: CPT | Performed by: REGISTERED NURSE

## 2024-12-04 PROCEDURE — 88305 TISSUE EXAM BY PATHOLOGIST: CPT | Performed by: STUDENT IN AN ORGANIZED HEALTH CARE EDUCATION/TRAINING PROGRAM

## 2024-12-04 PROCEDURE — 80053 COMPREHEN METABOLIC PANEL: CPT

## 2024-12-04 PROCEDURE — 36415 COLL VENOUS BLD VENIPUNCTURE: CPT

## 2024-12-04 PROCEDURE — 99204 OFFICE O/P NEW MOD 45 MIN: CPT | Performed by: REGISTERED NURSE

## 2024-12-04 RX ORDER — ECONAZOLE NITRATE 10 MG/G
CREAM TOPICAL DAILY
Qty: 85 G | Refills: 2 | Status: SHIPPED | OUTPATIENT
Start: 2024-12-04

## 2024-12-04 RX ORDER — KETOCONAZOLE 20 MG/G
CREAM TOPICAL DAILY
Qty: 60 G | Refills: 0 | Status: CANCELLED | OUTPATIENT
Start: 2024-12-04

## 2024-12-04 NOTE — PROGRESS NOTES
"[facility] Dermatology Clinic Note     Patient Name: Jimbo Suresh  Encounter Date: 12/4/24     Have you been cared for by a [facility] Dermatologist in the last 3 years and, if so, which description applies to you?    NO.   I am considered a \"new\" patient and must complete all patient intake questions. I am MALE/not capable of bearing children.    REVIEW OF SYSTEMS:  Have you recently had or currently have any of the following? Recent fever or chills? No  Any non-healing wound? No   PAST MEDICAL HISTORY:  Have you personally ever had or currently have any of the following?  If \"YES,\" then please provide more detail. Skin cancer (such as Melanoma, Basal Cell Carcinoma, Squamous Cell Carcinoma?  No  Tuberculosis, HIV/AIDS, Hepatitis B or C: No  Radiation Treatment No   HISTORY OF IMMUNOSUPPRESSION:   Do you have a history of any of the following:  Systemic Immunosuppression such as Diabetes, Biologic or Immunotherapy, Chemotherapy, Organ Transplantation, Bone Marrow Transplantation or Prednisone?  No     Answering \"YES\" requires the addition of the dotphrase \"IMMUNOSUPPRESSED\" as the first diagnosis of the patient's visit.   FAMILY HISTORY:  Any \"first degree relatives\" (parent, brother, sister, or child) with the following?    Skin Cancer, Pancreatic or Other Cancer? No   PATIENT EXPERIENCE:    Do you want the Dermatologist to perform a COMPLETE skin exam today including a clinical examination under the \"bra and underwear\" areas?  NO  If necessary, do we have your permission to call and leave a detailed message on your Preferred Phone number that includes your specific medical information?  Yes      No Known Allergies   Current Outpatient Medications:     amLODIPine (NORVASC) 10 mg tablet, Take 1 tablet by mouth once daily, Disp: 100 tablet, Rfl: 1    atorvastatin (LIPITOR) 40 mg tablet, Take 1 tablet by mouth once daily, Disp: 100 tablet, Rfl: 1    cetirizine (ZyrTEC) 10 MG chewable tablet, Chew 1 tablet (10 mg " "total) daily, Disp: 90 tablet, Rfl: 0    tadalafil (CIALIS) 10 MG tablet, Take 1 tablet (10 mg total) by mouth daily as needed for erectile dysfunction, Disp: 10 tablet, Rfl: 0    terazosin (HYTRIN) 2 mg capsule, Take 1 capsule (2 mg total) by mouth daily at bedtime, Disp: 90 capsule, Rfl: 1    methylPREDNISolone 4 MG tablet therapy pack, Use as directed on package (Patient not taking: Reported on 8/9/2024), Disp: 21 each, Rfl: 0          Whom besides the patient is providing clinical information about today's encounter?   NO ADDITIONAL HISTORIAN (patient alone provided history)  Other:      Physical Exam and Assessment/Plan by Diagnosis:    SEBORRHEIC KERATOSES  - Relevant exam: Scattered over the trunk/extremities are waxy brown to black plaques and papules with stuck on appearance  -Presents for spot of concern on back   - Exam and clinical history consistent with seborrheic keratoses  - Counseled that these are benign growths that do not require treatment  - Counseled that removal of lesions is considered cosmetic and so would incur a fee should patient elect to move forward.   - Patient to hold on treatments for now but will inform us should they desire additional treatments    TINEA CRURIS (\"JOCK ITCH\")    Physical Exam:  Anatomic Location Affected:  Groin  Morphological Description:  annular erythematous scaly plaques   Pertinent Positives:  Pertinent Negatives:    Additional History of Present Condition:  States itchiness    Assessment and Plan:  Based on a thorough discussion of this condition and the management approach to it (including a comprehensive discussion of the known risks, side effects and potential benefits of treatment), the patient (family) agrees to implement the following specific plan:  JOESPH performed in office- positive result for fungus  Apply econazole nitrate 1% cream to affected area daily    What is tinea cruris?  Tinea cruris is the name used for infection of the groin with " a dermatophyte fungus. It is most often seen in adult men. Tinea cruris is commonly known as jock itch.    In different parts of the world, different species cause tinea cruris. In [place], Trichophyton rubrum and Epidermophyton floccosum are the most common causes. Infection often comes from the feet (tinea pedis) or nails (tinea unguium) originally, spread by scratching or the use of an infected towel.    The appearance is similar to ringworm (tinea corporis). The rash has a scaly raised red border that spreads down the inner thighs from the groin or scrotum. Tinea cruris may form ring-like patterns on the buttocks. It is not often seen on the penis or vulva or around the anus. Tinea cruris can be very itchy.    Tinea cruris can be confused with other forms of intertrigo such as:  Candida   Erythrasma   Psoriasis   Seborrhoeic dermatitis    Tinea cruris quite often recurs after apparently successful treatment. To reduce the chance of reinfection:  Treat the feet if tinea pedis is present.   Dry the groin carefully after bathing using a separate towel.   Do not share towels, sheets or personal clothing.   Avoid wearing occlusive or synthetic clothing.   If you are overweight, try to lose weight to reduce chafing and sweating.    Diagnosis of tinea cruris  The diagnosis of tinea cruris is confirmed by microscopy and culture of skin scrapings.    Treatment of tinea cruris  Tinea cruris is usually treated with topical antifungal agents. Sometimes hydrocortisone is added, for faster relief of itch. Topical steroids should not be used on their own. If the treatment is unsuccessful, oral antifungal medicines may be considered, including terbinafine and itraconazole.     NAIL LESION    Physical Exam:  Anatomic Location Affected:  toe nails  Morphological Description:  thickened yellowish nails  Pertinent Positives:  Pertinent Negatives:    Additional History of Present Condition:  Presents for concerns about nail  fungus    Assessment and Plan:  Based on a thorough discussion of this condition and the management approach to it (including a comprehensive discussion of the known risks, side effects and potential benefits of treatment), the patient (family) agrees to implement the following specific plan:  Discussed that her toe nails is likely due to fungal infection and that will recommend a nail clipping for confirmation.   Nail clipping done in office today.  Discussed that the best treatment for nail fungus is a 12 week course of terbinafine and side effects as follows, HA, N/V, diarrhea, dysgeusia, dyspepsia, increased LFT's, fatigue, arthralgia, myalgia, flu like symptoms. Advised to stop and reach out to us if she develops severe abdominal pain or mild to moderate abdominal pain that last for 3 days or more.    Patient will like to start terbinafine once onychomycosis is confirmed, will prescribe it once it is confirmed and LFT's are wnl.   CMP lab ordered.     Scribe Attestation      I,:  Randa Encinas am acting as a scribe while in the presence of the attending physician.:       I,:  Dharmesh Calzada MD personally performed the services described in this documentation    as scribed in my presence.:

## 2024-12-06 ENCOUNTER — OFFICE VISIT (OUTPATIENT)
Dept: OBGYN CLINIC | Facility: OTHER | Age: 74
End: 2024-12-06

## 2024-12-06 VITALS
SYSTOLIC BLOOD PRESSURE: 138 MMHG | HEIGHT: 65 IN | BODY MASS INDEX: 27.49 KG/M2 | DIASTOLIC BLOOD PRESSURE: 81 MMHG | WEIGHT: 165 LBS | HEART RATE: 90 BPM

## 2024-12-06 DIAGNOSIS — M70.61 GREATER TROCHANTERIC BURSITIS OF RIGHT HIP: Primary | ICD-10-CM

## 2024-12-06 NOTE — PROGRESS NOTES
Chief Complaint: Right Hip pain    HPI:    Jimbo Suresh is a 74 year old Male who presents today for follow up of USGI right hip injection. If patient is not walking his pain is 2/10. If he walks a lot its a 6/10. Pain is more of a nuisance. Denies it being a severe sharp pain. Patient states his current pain is not like his previous hip pain. He previously felt that the pain would radiate into his groin but his current pain is limited to his greater trochanter region.      Patient Active Problem List   Diagnosis    BPH associated with nocturia    Osteoarthritis of both hands    Essential hypertension    Erectile dysfunction    Hyperlipidemia    Joint swelling    Snoring    Acute right-sided low back pain with right-sided sciatica    Right hip pain    Microscopic hematuria    Impaired fasting glucose    Need for RSV immunization    Encounter for annual physical exam    Screening for prostate cancer        Current Outpatient Medications on File Prior to Visit   Medication Sig Dispense Refill    amLODIPine (NORVASC) 10 mg tablet Take 1 tablet by mouth once daily 100 tablet 1    atorvastatin (LIPITOR) 40 mg tablet Take 1 tablet by mouth once daily 100 tablet 1    cetirizine (ZyrTEC) 10 MG chewable tablet Chew 1 tablet (10 mg total) daily 90 tablet 0    econazole nitrate 1 % cream Apply topically daily Apply to affected area in the groin daily. 85 g 2    tadalafil (CIALIS) 10 MG tablet Take 1 tablet (10 mg total) by mouth daily as needed for erectile dysfunction 10 tablet 0    terazosin (HYTRIN) 2 mg capsule Take 1 capsule (2 mg total) by mouth daily at bedtime 90 capsule 1    methylPREDNISolone 4 MG tablet therapy pack Use as directed on package (Patient not taking: Reported on 8/9/2024) 21 each 0     No current facility-administered medications on file prior to visit.        No Known Allergies     Tobacco Use: Medium Risk (12/6/2024)    Patient History     Smoking Tobacco Use: Former     Smokeless Tobacco Use: Never      Passive Exposure: Not on file        Social Drivers of Health     Tobacco Use: Medium Risk (12/6/2024)    Patient History     Smoking Tobacco Use: Former     Smokeless Tobacco Use: Never     Passive Exposure: Not on file   Alcohol Use: Not on file   Financial Resource Strain: Low Risk  (1/20/2023)    Overall Financial Resource Strain (CARDIA)     Difficulty of Paying Living Expenses: Not very hard   Food Insecurity: No Food Insecurity (1/20/2023)    Hunger Vital Sign     Worried About Running Out of Food in the Last Year: Never true     Ran Out of Food in the Last Year: Never true   Transportation Needs: No Transportation Needs (1/20/2023)    PRAPARE - Transportation     Lack of Transportation (Medical): No     Lack of Transportation (Non-Medical): No   Physical Activity: Inactive (7/22/2021)    Exercise Vital Sign     Days of Exercise per Week: 0 days     Minutes of Exercise per Session: 0 min   Stress: Not on file   Social Connections: Not on file   Intimate Partner Violence: Not on file   Depression: Not at risk (9/29/2023)    PHQ-2     PHQ-2 Score: 1   Housing Stability: Low Risk  (4/7/2022)    Housing Stability Vital Sign     Unable to Pay for Housing in the Last Year: No     Number of Places Lived in the Last Year: 1     Unstable Housing in the Last Year: No   Utilities: Not on file   Health Literacy: Not on file               Review of Systems     Body mass index is 27.46 kg/m².     Physical Exam     Ortho Exam:    Body part: right hip    Inspection: No erythema or edema    Palpation: Tender to palpation of right greater trochanter    Range of motion: Full ROM    Special Tests: Negative log roll, NIKKI, FADIR, SLR (-)    Distal Neurovascular Status: Intact, Yes      Large joint arthrocentesis: R greater trochanteric bursa  Universal Protocol:  Procedure performed by: (Dr. Harsh Thompson)  Consent: Verbal consent obtained.  Risks and benefits: risks, benefits and alternatives were discussed  Consent given  "by: patient  Patient understanding: patient states understanding of the procedure being performed  Site marked: the operative site was marked  Radiology Images displayed and confirmed. If images not available, report reviewed: imaging studies available  Required items: required blood products, implants, devices, and special equipment available  Patient identity confirmed: verbally with patient  Supporting Documentation  Indications: pain   Procedure Details  Location: hip - R greater trochanteric bursa  Preparation: Patient was prepped and draped in the usual sterile fashion  Needle size: 22 G  Ultrasound guidance: no  Approach: lateral  Medication group details: 4 mL lidocaine 1 %; 40 mg triamcinolone acetonide 40 mg/m.    Patient tolerance: patient tolerated the procedure well with no immediate complications  Dressing:  Sterile dressing applied             Assessment:     Diagnosis ICD-10-CM Associated Orders   1. Greater trochanteric bursitis of right hip  M70.61            Plan:    Patient advised that his pain is not consistent with his previous findings that were related to his hip OA. His current pain appears to be due to greater trochanteric bursitis. Patient accepted a CSI injection today and tolerated it well. Patient may follow up as needed if his pain continues or worsens.       Follow-Up:    4 weeks PRN        Portions of the record may have been created with voice recognition software. Occasional wrong word or \"sound alike\" substitutions may have occurred due to the inherent limitations of voice recognition software. Please review the chart carefully and recognize, using context, where substitutions/typographical errors may have occurred.          "

## 2024-12-09 DIAGNOSIS — Z91.89 AT RISK FOR OBSTRUCTIVE SLEEP APNEA: ICD-10-CM

## 2024-12-09 DIAGNOSIS — I10 ESSENTIAL HYPERTENSION: Primary | ICD-10-CM

## 2024-12-09 PROCEDURE — 88305 TISSUE EXAM BY PATHOLOGIST: CPT | Performed by: STUDENT IN AN ORGANIZED HEALTH CARE EDUCATION/TRAINING PROGRAM

## 2024-12-09 PROCEDURE — 88312 SPECIAL STAINS GROUP 1: CPT | Performed by: STUDENT IN AN ORGANIZED HEALTH CARE EDUCATION/TRAINING PROGRAM

## 2024-12-10 ENCOUNTER — RESULTS FOLLOW-UP (OUTPATIENT)
Age: 74
End: 2024-12-10

## 2024-12-10 DIAGNOSIS — B35.1 ONYCHOMYCOSIS: Primary | ICD-10-CM

## 2024-12-10 RX ORDER — TERBINAFINE HYDROCHLORIDE 250 MG/1
250 TABLET ORAL DAILY
Qty: 84 TABLET | Refills: 0 | Status: SHIPPED | OUTPATIENT
Start: 2024-12-10 | End: 2025-03-04

## 2024-12-11 NOTE — RESULT ENCOUNTER NOTE
DERMATOPATHOLOGY RESULT NOTE    Results reviewed by ordering physician.  Called patient to personally discuss results. Discussed results with patient.       Instructions for Clinical Derm Team:   (remember to route Result Note to appropriate staff):    None    Result & Plan by Specimen:    Specimen A: Consistent with onychomycosis   Plan: Will prescribe a 12 week course of terbinafine 250mg daily. Side effects previously reviewed with patient. LFT's wnl.       A. Nail, 1st toe nail:    ONYCHOMYCOSIS (see note).    Note: Fungi are seen with PAS stain.    Electronically signed by Marlene Madrid MD on 12/9/2024 at 1503 EST

## 2024-12-18 ENCOUNTER — CONSULT (OUTPATIENT)
Dept: PULMONOLOGY | Facility: CLINIC | Age: 74
End: 2024-12-18
Payer: MEDICARE

## 2024-12-18 VITALS
HEIGHT: 65 IN | DIASTOLIC BLOOD PRESSURE: 76 MMHG | TEMPERATURE: 98.6 F | BODY MASS INDEX: 27.82 KG/M2 | SYSTOLIC BLOOD PRESSURE: 138 MMHG | HEART RATE: 109 BPM | WEIGHT: 167 LBS | OXYGEN SATURATION: 98 %

## 2024-12-18 DIAGNOSIS — R06.81 WITNESSED EPISODE OF APNEA: ICD-10-CM

## 2024-12-18 DIAGNOSIS — I10 ESSENTIAL HYPERTENSION: Primary | ICD-10-CM

## 2024-12-18 DIAGNOSIS — R06.83 SNORING: ICD-10-CM

## 2024-12-18 DIAGNOSIS — G47.09 OTHER INSOMNIA: ICD-10-CM

## 2024-12-18 PROCEDURE — 99244 OFF/OP CNSLTJ NEW/EST MOD 40: CPT | Performed by: INTERNAL MEDICINE

## 2024-12-18 NOTE — PROGRESS NOTES
Name: Jimbo Suresh      : 1950      MRN: [id number]  Encounter Provider: Johnnie Thompson MD  Encounter Date: 2024   [facility]  :  Assessment & Plan  Essential hypertension  History of hypertension and has been on treatment with amlodipine.       Snoring    Orders:    Diagnostic Sleep Study; Future    Witnessed episode of apnea  Jimbo Suresh has been having snoring, sleep maintenance insomnia, witnessed apneas and waking up episodes during sleep gasping for air.  He denied any morning headache.  He felt some daytime sleepiness and tiredness.  However his Lawtey sleepiness score was 2 out of 24.  He denied any nocturia.  He denied any problem driving.   He has sleep maintenance insomnia.  He wakes up frequently during sleep and cannot go back to sleep.  He wakes up not refreshed.  On clinical examination he had oropharyngeal crowding.  I have ordered a overnight sleep study for further evaluation.  He could benefit from CPAP therapy.  Orders:    Diagnostic Sleep Study; Future    Other insomnia  He has sleep maintenance insomnia.  Be due to sleep apnea.  Orders:    Diagnostic Sleep Study; Future        History of Present Illness   HPI  Jimbo Suresh is a 74 y.o. male has been referred for evaluation for sleep breathing disorder.  He has history of hypertension.  He has been having snoring, sleep maintenance insomnia, witnessed apneas and waking up episodes during sleep gasping for air.  He denied any morning headache.  He felt some daytime sleepiness and tiredness.  However his Lawtey sleepiness score was 2 out of 24.  He denied any nocturia.  He denied any problem driving.  He has not been sleep tested before.  He has sleep maintenance insomnia.  He wakes up frequently during sleep and cannot go back to sleep.  He wakes up not refreshed.  Has history of allergies and uses cetirizine.  He has history of hypertension and has been  "on treatment with amlodipine.  Denied any significant shortness of breath cough or phlegm or wheeze or chest pain.  No swelling of feet.  His appetite has been good.  He denied any thyroid problems.  He denied any heart problems or stroke.      Review of Systems   Constitutional:  Positive for fatigue. Negative for chills and fever.   HENT:  Negative for hearing loss, rhinorrhea, sneezing and trouble swallowing.    Respiratory:  Positive for apnea. Negative for cough, shortness of breath and wheezing.    Cardiovascular:  Negative for chest pain, palpitations and leg swelling.   Gastrointestinal:  Negative for abdominal pain, constipation, diarrhea, nausea and vomiting.   Genitourinary:  Negative for dysuria, frequency and urgency.   Musculoskeletal:  Positive for arthralgias. Negative for gait problem.   Skin:  Negative for rash.   Allergic/Immunologic: Positive for environmental allergies.   Neurological:  Negative for dizziness, syncope, light-headedness and headaches.   Psychiatric/Behavioral:  Positive for sleep disturbance. Negative for agitation and confusion. The patient is not nervous/anxious.           Objective   /76 (BP Location: Right arm, Patient Position: Sitting, Cuff Size: Standard)   Pulse (!) 109   Temp 98.6 °F (37 °C) (Tympanic)   Ht 5' 5\" (1.651 m)   Wt 75.8 kg (167 lb)   SpO2 98%   BMI 27.79 kg/m²      Physical Exam  Vitals reviewed.   Constitutional:       General: He is not in acute distress.     Appearance: He is not ill-appearing, toxic-appearing or diaphoretic.   HENT:      Head: Normocephalic.      Mouth/Throat:      Mouth: Mucous membranes are moist.      Comments: Oropharyngeal crowding.  Eyes:      General: No scleral icterus.     Conjunctiva/sclera: Conjunctivae normal.   Cardiovascular:      Rate and Rhythm: Normal rate and regular rhythm.      Heart sounds: Normal heart sounds. No murmur heard.  Pulmonary:      Effort: Pulmonary effort is normal. No respiratory distress. "      Breath sounds: Normal breath sounds. No stridor. No wheezing, rhonchi or rales.   Chest:      Chest wall: No tenderness.   Abdominal:      General: Bowel sounds are normal.      Palpations: Abdomen is soft.      Tenderness: There is no abdominal tenderness. There is no guarding.   Musculoskeletal:      Cervical back: Neck supple. No rigidity.      Right lower leg: No edema.      Left lower leg: No edema.   Lymphadenopathy:      Cervical: No cervical adenopathy.   Skin:     Coloration: Skin is not jaundiced or pale.      Findings: No rash.   Neurological:      Mental Status: He is alert and oriented to person, place, and time.      Gait: Gait normal.   Psychiatric:         Mood and Affect: Mood normal.         Behavior: Behavior normal.         Thought Content: Thought content normal.         Judgment: Judgment normal.

## 2024-12-18 NOTE — ASSESSMENT & PLAN NOTE
He has sleep maintenance insomnia.  Be due to sleep apnea.  Orders:    Diagnostic Sleep Study; Future

## 2024-12-19 PROBLEM — Z12.5 SCREENING FOR PROSTATE CANCER: Status: RESOLVED | Noted: 2024-11-19 | Resolved: 2024-12-19

## 2025-01-20 DIAGNOSIS — N52.9 ERECTILE DYSFUNCTION, UNSPECIFIED ERECTILE DYSFUNCTION TYPE: ICD-10-CM

## 2025-01-21 RX ORDER — TADALAFIL 10 MG/1
TABLET ORAL
Qty: 10 TABLET | Refills: 0 | Status: SHIPPED | OUTPATIENT
Start: 2025-01-21

## 2025-01-28 DIAGNOSIS — R35.1 BPH ASSOCIATED WITH NOCTURIA: ICD-10-CM

## 2025-01-28 DIAGNOSIS — N40.1 BPH ASSOCIATED WITH NOCTURIA: ICD-10-CM

## 2025-01-28 RX ORDER — TERAZOSIN 2 MG/1
2 CAPSULE ORAL
Qty: 90 CAPSULE | Refills: 1 | Status: SHIPPED | OUTPATIENT
Start: 2025-01-28

## 2025-02-08 DIAGNOSIS — I10 ESSENTIAL HYPERTENSION: ICD-10-CM

## 2025-02-08 DIAGNOSIS — E78.5 HYPERLIPIDEMIA: ICD-10-CM

## 2025-02-10 ENCOUNTER — OFFICE VISIT (OUTPATIENT)
Dept: INTERNAL MEDICINE CLINIC | Facility: CLINIC | Age: 75
End: 2025-02-10
Payer: MEDICARE

## 2025-02-10 VITALS
OXYGEN SATURATION: 98 % | WEIGHT: 167.8 LBS | DIASTOLIC BLOOD PRESSURE: 66 MMHG | HEART RATE: 95 BPM | BODY MASS INDEX: 26.97 KG/M2 | TEMPERATURE: 98.4 F | SYSTOLIC BLOOD PRESSURE: 124 MMHG | HEIGHT: 66 IN

## 2025-02-10 DIAGNOSIS — I49.9 IRREGULAR HEART RHYTHM: ICD-10-CM

## 2025-02-10 DIAGNOSIS — B35.1 ONYCHOMYCOSIS: ICD-10-CM

## 2025-02-10 DIAGNOSIS — I10 ESSENTIAL HYPERTENSION: Primary | ICD-10-CM

## 2025-02-10 DIAGNOSIS — E78.5 HYPERLIPIDEMIA, UNSPECIFIED HYPERLIPIDEMIA TYPE: ICD-10-CM

## 2025-02-10 DIAGNOSIS — R21 RASH: ICD-10-CM

## 2025-02-10 PROCEDURE — 99214 OFFICE O/P EST MOD 30 MIN: CPT

## 2025-02-10 PROCEDURE — 93000 ELECTROCARDIOGRAM COMPLETE: CPT

## 2025-02-10 RX ORDER — ATORVASTATIN CALCIUM 40 MG/1
40 TABLET, FILM COATED ORAL DAILY
Qty: 100 TABLET | Refills: 1 | Status: SHIPPED | OUTPATIENT
Start: 2025-02-10

## 2025-02-10 RX ORDER — AMLODIPINE BESYLATE 10 MG/1
10 TABLET ORAL DAILY
Qty: 100 TABLET | Refills: 1 | Status: SHIPPED | OUTPATIENT
Start: 2025-02-10

## 2025-02-10 NOTE — ASSESSMENT & PLAN NOTE
Irregular rhythm noticed on auscultation   Has had some sx of palpitations and SOB with exertion while on the treadmill   Denies CP, SOB at rest, palpitations, nausea, diaphoresis, lower extremity swelling, orthopnea   Recently diagnosed with CHRISTIAN on CPAP   EKF in office SR 92 with some PVCs, no ischemic changes     Plan-   Will send to Cardiology for zio/loop recorder     Orders:    POCT ECG    Ambulatory Referral to Cardiology; Future

## 2025-02-10 NOTE — ASSESSMENT & PLAN NOTE
Lab Results   Component Value Date    CHOLESTEROL 136 07/23/2024    CHOLESTEROL 131 02/03/2023    CHOLESTEROL 203 (H) 03/04/2020     Lab Results   Component Value Date    HDL 65 07/23/2024    HDL 69 02/03/2023    HDL 56 03/04/2020     Lab Results   Component Value Date    TRIG 103 07/23/2024    TRIG 80 02/03/2023    TRIG 127 03/04/2020     Lab Results   Component Value Date    NONHDLC 147 03/04/2020   LDL 50    Plan-   Up to date on HLD screening   Continue home regimen of Lipitor 40mg QD

## 2025-02-10 NOTE — ASSESSMENT & PLAN NOTE
BP during visit 124/66  Home regimen includes Amlodipine 10mg QD     Plan-   BP well controlled  Continue current regimen

## 2025-02-10 NOTE — ASSESSMENT & PLAN NOTE
Complaining of LLE rash onset within the past week   Described the rash as itchy, circular in pattern and purplish in color   Used Vaseline with resolution of sx    Plan-   Will continue emollients cerave/vaseline to the areas   Continue to monitor for now

## 2025-02-10 NOTE — PROGRESS NOTES
Name: Jimbo Suresh      : 1950      MRN: [id number]  Encounter Provider: Charity Altman MD  Encounter Date: 2/10/2025   Encounter department: [facility] MEDICINE [place]  :  Assessment & Plan  Essential hypertension  BP during visit 124/66  Home regimen includes Amlodipine 10mg QD     Plan-   BP well controlled  Continue current regimen        Irregular heart rhythm  Irregular rhythm noticed on auscultation   Has had some sx of palpitations and SOB with exertion while on the treadmill   Denies CP, SOB at rest, palpitations, nausea, diaphoresis, lower extremity swelling, orthopnea   Recently diagnosed with CHRISTIAN on CPAP   EKF in office SR 92 with some PVCs, no ischemic changes     Plan-   Will send to Cardiology for zio/loop recorder     Orders:    POCT ECG    Ambulatory Referral to Cardiology; Future    Hyperlipidemia, unspecified hyperlipidemia type  Lab Results   Component Value Date    CHOLESTEROL 136 2024    CHOLESTEROL 131 2023    CHOLESTEROL 203 (H) 2020     Lab Results   Component Value Date    HDL 65 2024    HDL 69 2023    HDL 56 2020     Lab Results   Component Value Date    TRIG 103 2024    TRIG 80 2023    TRIG 127 2020     Lab Results   Component Value Date    NONHDLC 147 2020   LDL 50    Plan-   Up to date on HLD screening   Continue home regimen of Lipitor 40mg QD            Rash  Complaining of LLE rash onset within the past week   Described the rash as itchy, circular in pattern and purplish in color   Used Vaseline with resolution of sx    Plan-   Will continue emollients cerave/vaseline to the areas   Continue to monitor for now                 History of Present Illness   Mr. Suresh is a pleasant 75 yo M with a PMH of HTN, HLD, CHRISTIAN on CPAP who presents to the office for a follow up visit. Patient is complaining of inner LLE rash that occurred this week described the rash as itchy, raised that was circular in pattern and  "purplish that resolved after vaseline use. Upon questions patient endorses that when he is on the treadmill he does have some palpitations and SOB after being on the treadmill for about an hour.  Patient denies tick bites, recent travel, swelling, pain, new detergents/soaps, other rashes on body, fevers, chills, unintentional weight loss/significant weight gain, night sweats, CP, SOB at rest, orthopnea, LE swelling, abdominal boating, or any other sx at this time.       Review of Systems   Constitutional:  Negative for activity change, chills, fatigue, fever and unexpected weight change.   HENT:  Negative for ear pain and sore throat.    Eyes:  Negative for pain and visual disturbance.   Respiratory:  Negative for cough, chest tightness, shortness of breath and wheezing.    Cardiovascular:  Positive for palpitations. Negative for chest pain and leg swelling.   Gastrointestinal:  Negative for abdominal pain, constipation, diarrhea, nausea and vomiting.   Endocrine: Negative for cold intolerance, heat intolerance, polydipsia and polyuria.   Genitourinary:  Negative for dysuria, frequency, hematuria and urgency.   Musculoskeletal:  Negative for arthralgias, back pain and myalgias.   Skin:  Positive for rash. Negative for color change.   Neurological:  Negative for dizziness, seizures, syncope, facial asymmetry, weakness, light-headedness and numbness.   Psychiatric/Behavioral:  Negative for agitation and confusion. The patient is not nervous/anxious.    All other systems reviewed and are negative.      Objective   /66 (BP Location: Left arm, Patient Position: Sitting, Cuff Size: Large)   Pulse 95   Temp 98.4 °F (36.9 °C) (Tympanic)   Ht 5' 5.5\" (1.664 m)   Wt 76.1 kg (167 lb 12.8 oz)   SpO2 98%   BMI 27.50 kg/m²      Physical Exam  Vitals and nursing note reviewed.   Constitutional:       General: He is not in acute distress.     Appearance: He is well-developed. He is not ill-appearing, toxic-appearing or " diaphoretic.   HENT:      Head: Normocephalic and atraumatic.      Mouth/Throat:      Mouth: Mucous membranes are moist.   Eyes:      Extraocular Movements: Extraocular movements intact.      Conjunctiva/sclera: Conjunctivae normal.      Pupils: Pupils are equal, round, and reactive to light.   Cardiovascular:      Rate and Rhythm: Normal rate. Rhythm irregular.      Pulses: Normal pulses.      Heart sounds: No murmur heard.     No friction rub. No gallop.   Pulmonary:      Effort: Pulmonary effort is normal. No respiratory distress.      Breath sounds: Normal breath sounds. No wheezing.   Chest:      Chest wall: No tenderness.   Abdominal:      General: Bowel sounds are normal. There is no distension.      Palpations: Abdomen is soft. There is no mass.      Tenderness: There is no abdominal tenderness. There is no guarding or rebound.   Musculoskeletal:         General: No swelling or tenderness. Normal range of motion.      Cervical back: Normal range of motion and neck supple.      Right lower leg: No edema.      Left lower leg: No edema.   Skin:     General: Skin is warm and dry.      Capillary Refill: Capillary refill takes less than 2 seconds.      Coloration: Skin is not jaundiced or pale.      Findings: No bruising, erythema, lesion or rash.   Neurological:      General: No focal deficit present.      Mental Status: He is alert and oriented to person, place, and time.      Cranial Nerves: No cranial nerve deficit.      Sensory: No sensory deficit.      Motor: No weakness.      Coordination: Coordination normal.      Gait: Gait normal.   Psychiatric:         Mood and Affect: Mood normal.         Behavior: Behavior normal.         Thought Content: Thought content normal.       Administrative Statements   I have spent a total time of 35 minutes in caring for this patient on the day of the visit/encounter including Diagnostic results, Importance of tx compliance, Risk factor reductions, Impressions, Reviewing /  ordering tests, medicine, procedures  , and Obtaining or reviewing history  .

## 2025-02-11 NOTE — TELEPHONE ENCOUNTER
Pt was seen 12/10/24     · Discussed that her toe nails is likely due to fungal infection and that will recommend a nail clipping for confirmation.   · Nail clipping done in office today.  · Discussed that the best treatment for nail fungus is a 12 week course of terbinafine and side effects as follows, HA, N/V, diarrhea, dysgeusia, dyspepsia, increased LFT's, fatigue, arthralgia, myalgia, flu like symptoms. Advised to stop and reach out to us if she develops severe abdominal pain or mild to moderate abdominal pain that last for 3 days or more.    · Patient will like to start terbinafine once onychomycosis is confirmed, will prescribe it once it is confirmed and LFT's are wnl.   · CMP lab ordered.

## 2025-02-12 DIAGNOSIS — B35.1 ONYCHOMYCOSIS: ICD-10-CM

## 2025-02-12 RX ORDER — TERBINAFINE HYDROCHLORIDE 250 MG/1
250 TABLET ORAL DAILY
Qty: 84 TABLET | Refills: 0 | OUTPATIENT
Start: 2025-02-12 | End: 2025-05-07

## 2025-02-12 RX ORDER — TERBINAFINE HYDROCHLORIDE 250 MG/1
250 TABLET ORAL DAILY
Qty: 84 TABLET | Refills: 0 | OUTPATIENT
Start: 2025-02-12

## 2025-02-14 RX ORDER — TERBINAFINE HYDROCHLORIDE 250 MG/1
250 TABLET ORAL DAILY
Qty: 84 TABLET | Refills: 0 | OUTPATIENT
Start: 2025-02-14

## 2025-02-14 NOTE — TELEPHONE ENCOUNTER
Pt did 12 weeks of treatment please refuse medication      Phone called to pt spoke to daughter. They was requesting medication for his hands. Advise he needs to be check by the doctor first .    Refuse appt and will contact us to schedule

## 2025-03-13 ENCOUNTER — OFFICE VISIT (OUTPATIENT)
Dept: INTERNAL MEDICINE CLINIC | Facility: CLINIC | Age: 75
End: 2025-03-13
Payer: MEDICARE

## 2025-03-13 VITALS
DIASTOLIC BLOOD PRESSURE: 80 MMHG | HEART RATE: 85 BPM | BODY MASS INDEX: 27 KG/M2 | OXYGEN SATURATION: 98 % | TEMPERATURE: 98 F | SYSTOLIC BLOOD PRESSURE: 120 MMHG | HEIGHT: 66 IN | RESPIRATION RATE: 16 BRPM | WEIGHT: 168 LBS

## 2025-03-13 DIAGNOSIS — N52.9 ERECTILE DYSFUNCTION, UNSPECIFIED ERECTILE DYSFUNCTION TYPE: ICD-10-CM

## 2025-03-13 DIAGNOSIS — B02.9 HERPES ZOSTER WITHOUT COMPLICATION: Primary | ICD-10-CM

## 2025-03-13 PROCEDURE — 99214 OFFICE O/P EST MOD 30 MIN: CPT | Performed by: STUDENT IN AN ORGANIZED HEALTH CARE EDUCATION/TRAINING PROGRAM

## 2025-03-13 RX ORDER — ACYCLOVIR 50 MG/G
OINTMENT TOPICAL
Qty: 5 G | Refills: 0 | Status: CANCELLED | OUTPATIENT
Start: 2025-03-13 | End: 2025-03-20

## 2025-03-13 RX ORDER — TADALAFIL 10 MG/1
10 TABLET ORAL DAILY PRN
Qty: 10 TABLET | Refills: 0 | Status: SHIPPED | OUTPATIENT
Start: 2025-03-13

## 2025-03-13 RX ORDER — ACYCLOVIR 800 MG/1
800 TABLET ORAL 4 TIMES DAILY
Qty: 28 TABLET | Refills: 0 | Status: CANCELLED | OUTPATIENT
Start: 2025-03-13 | End: 2025-03-20

## 2025-03-13 RX ORDER — VALACYCLOVIR HYDROCHLORIDE 1 G/1
1000 TABLET, FILM COATED ORAL 3 TIMES DAILY
Qty: 21 TABLET | Refills: 0 | Status: SHIPPED | OUTPATIENT
Start: 2025-03-13 | End: 2025-03-20

## 2025-03-13 NOTE — PROGRESS NOTES
Name: Jimbo Suresh      : 1950      MRN: [id number]  Encounter Provider: Emma Rodriguez MD  Encounter Date: 3/13/2025   Encounter department: [facility] INTER[facility] MEDICINE [place]  :  Assessment & Plan  Herpes zoster without complication  Sx of rash behind pt's ear for the last 2 weeks  Not itchy, no discharge, only behind left ear, auditory canal is not involved, described as burning and painful  On physical exam: multiple maculo-papular lesions with vesicles inside. Flesh colored on erythematous basis. Around left ear, scalp and hair are not involved. Skin behind right ear is clear  Plan:  Start Valtrex 1000 mg TID for 7 days.  RTC next week for follow up  Orders:    valACYclovir (VALTREX) 1,000 mg tablet; Take 1 tablet (1,000 mg total) by mouth 3 (three) times a day for 7 days    Erectile dysfunction, unspecified erectile dysfunction type  Refill provided  Orders:    tadalafil (CIALIS) 10 MG tablet; Take 1 tablet (10 mg total) by mouth daily as needed for erectile dysfunction           History of Present Illness   74 years old male with past medical history of HTN, HLD, CHRISTIAN, BPH who presents for sick visit.    Today patient complains of several lesions behind his left ear which he noticed about 2 weeks ago. Pt describe rash as burning and painful when he is putting on his glasses, denies drainage, itchiness, denies new lotions, cream, glasses, pillow cases or towels. Pt  denies ear pain, tinnitus, hearing deficits, ear drainage, dizziness, lightheadedness, fever, chest pain, cough, nausea, vomiting, diarrhea, dysuria. Admits running nose with clear discharge when season  changes, started about 4 weeks ago. Also, The Pt  informs that he received Shingrix and Hepatitis B vaccine in [facility] about 2 weeks ago.     Rash  Pertinent negatives include no cough, fever, shortness of breath, sore throat or vomiting.     Review of Systems   Constitutional:  Negative for chills and fever.   HENT:   "Negative for ear pain and sore throat.    Eyes:  Negative for pain and visual disturbance.   Respiratory:  Negative for cough and shortness of breath.    Cardiovascular:  Negative for chest pain and palpitations.   Gastrointestinal:  Negative for abdominal pain and vomiting.   Genitourinary:  Negative for dysuria and hematuria.   Musculoskeletal:  Negative for arthralgias and back pain.   Skin:  Positive for rash. Negative for color change.   Neurological:  Negative for seizures and syncope.   All other systems reviewed and are negative.      Objective   /80 (BP Location: Left arm, Patient Position: Sitting, Cuff Size: Adult)   Pulse 85   Temp 98 °F (36.7 °C) (Tympanic)   Resp 16   Ht 5' 5.5\" (1.664 m)   Wt 76.2 kg (168 lb)   SpO2 98%   BMI 27.53 kg/m²      Physical Exam  Vitals and nursing note reviewed.   Constitutional:       General: He is not in acute distress.     Appearance: He is well-developed.   HENT:      Head: Normocephalic and atraumatic.   Eyes:      Conjunctiva/sclera: Conjunctivae normal.   Cardiovascular:      Rate and Rhythm: Normal rate and regular rhythm.      Heart sounds: No murmur heard.  Pulmonary:      Effort: Pulmonary effort is normal. No respiratory distress.      Breath sounds: Normal breath sounds.   Abdominal:      Palpations: Abdomen is soft.      Tenderness: There is no abdominal tenderness.   Musculoskeletal:         General: No swelling.      Cervical back: Neck supple.   Skin:     General: Skin is warm and dry.      Capillary Refill: Capillary refill takes less than 2 seconds.      Comments: Multiple maculo-papular lesions behind left ear on the erythematous bases. Small vesicles could be seen inside macules/papules. No crust. No opening. No drainage. No excoriation. Flesh colored on erythematous basis. Left auditory canal is not involved.  Skin behind right ear is clear.   Neurological:      Mental Status: He is alert.   Psychiatric:         Mood and Affect: Mood " normal.

## 2025-03-13 NOTE — ASSESSMENT & PLAN NOTE
Refill provided  Orders:    tadalafil (CIALIS) 10 MG tablet; Take 1 tablet (10 mg total) by mouth daily as needed for erectile dysfunction

## 2025-03-17 ENCOUNTER — OFFICE VISIT (OUTPATIENT)
Dept: INTERNAL MEDICINE CLINIC | Facility: CLINIC | Age: 75
End: 2025-03-17
Payer: MEDICARE

## 2025-03-17 VITALS
WEIGHT: 168 LBS | RESPIRATION RATE: 16 BRPM | HEIGHT: 66 IN | DIASTOLIC BLOOD PRESSURE: 74 MMHG | TEMPERATURE: 98 F | SYSTOLIC BLOOD PRESSURE: 130 MMHG | BODY MASS INDEX: 27 KG/M2 | OXYGEN SATURATION: 96 % | HEART RATE: 109 BPM

## 2025-03-17 DIAGNOSIS — R21 RASH: Primary | ICD-10-CM

## 2025-03-17 PROCEDURE — 99213 OFFICE O/P EST LOW 20 MIN: CPT | Performed by: INTERNAL MEDICINE

## 2025-03-17 RX ORDER — NEOMYCIN/BACITRACIN/POLYMYXINB 3.5-400-5K
OINTMENT (GRAM) TOPICAL 2 TIMES DAILY
Qty: 3.5 G | Refills: 0 | Status: SHIPPED | OUTPATIENT
Start: 2025-03-17

## 2025-03-17 NOTE — ASSESSMENT & PLAN NOTE
Rash behind the left ear treated as zoster with improvement but some blisters now have small yellowish content, recommended topical neosporin for the next 3 days, finish valacyclovir.     Orders:    neomycin-bacitracin-polymyxin (NEOSPORIN) 5-400-5,000 ointment; Apply topically 2 (two) times a day

## 2025-03-17 NOTE — PROGRESS NOTES
"Name: Jimbo Suresh      : 1950      MRN: [id number]  Encounter Provider: Kallie Knight MD  Encounter Date: 3/17/2025   Encounter department: [facility] MEDICINE [place]  :  Assessment & Plan  Rash  Rash behind the left ear treated as zoster with improvement but some blisters now have small yellowish content, recommended topical neosporin for the next 3 days, finish valacyclovir.     Orders:    neomycin-bacitracin-polymyxin (NEOSPORIN) 5-400-5,000 ointment; Apply topically 2 (two) times a day           History of Present Illness   Patient presents in the office for follow-up visit in regards to rash behind the left ear, there was treated with has helped zoster because of erythematous base and small vesicles.  He started to take valacyclovir the same day he came to the office, he notices likely improvement specially the redness.  Some vesicles are still present.  He denies pain.  Sometimes he feels a burning sensation but nothing that really bothers him.  He denies any systemic signs.      Review of Systems   Constitutional:  Negative for fatigue and fever.   Skin:  Positive for rash.   Neurological:  Negative for dizziness and headaches.   Psychiatric/Behavioral:  Negative for dysphoric mood and sleep disturbance.        Objective   /74 (BP Location: Left arm, Patient Position: Sitting, Cuff Size: Adult)   Pulse (!) 109   Temp 98 °F (36.7 °C) (Tympanic)   Resp 16   Ht 5' 5.5\" (1.664 m)   Wt 76.2 kg (168 lb)   SpO2 96%   BMI 27.53 kg/m²      Physical Exam  Constitutional:       Appearance: Normal appearance.   HENT:      Head: Normocephalic and atraumatic.   Cardiovascular:      Rate and Rhythm: Regular rhythm. Tachycardia present.   Pulmonary:      Effort: Pulmonary effort is normal. No respiratory distress.   Skin:     Capillary Refill: Capillary refill takes less than 2 seconds.      Findings: Rash present.   Neurological:      Mental Status: He is alert and oriented to " person, place, and time.   Psychiatric:         Thought Content: Thought content normal.         Judgment: Judgment normal.        Media Information    Document Information    Clinical Image - Mobile Device      03/17/2025 1:16 PM   Attached To:   Office Visit on 3/17/25 with Kallie Knight MD   Source Information    Kallie Knight MD   Internal Med [place]   Document History

## 2025-04-03 ENCOUNTER — CONSULT (OUTPATIENT)
Dept: CARDIOLOGY CLINIC | Facility: CLINIC | Age: 75
End: 2025-04-03
Payer: MEDICARE

## 2025-04-03 VITALS
HEART RATE: 84 BPM | DIASTOLIC BLOOD PRESSURE: 74 MMHG | OXYGEN SATURATION: 97 % | SYSTOLIC BLOOD PRESSURE: 132 MMHG | BODY MASS INDEX: 28.02 KG/M2 | WEIGHT: 171 LBS

## 2025-04-03 DIAGNOSIS — I49.9 IRREGULAR HEART RHYTHM: Primary | ICD-10-CM

## 2025-04-03 DIAGNOSIS — R06.09 DYSPNEA ON EXERTION: ICD-10-CM

## 2025-04-03 DIAGNOSIS — I49.3 PVC (PREMATURE VENTRICULAR CONTRACTION): ICD-10-CM

## 2025-04-03 DIAGNOSIS — I10 ESSENTIAL HYPERTENSION: ICD-10-CM

## 2025-04-03 DIAGNOSIS — E78.5 HYPERLIPIDEMIA, UNSPECIFIED HYPERLIPIDEMIA TYPE: ICD-10-CM

## 2025-04-03 PROCEDURE — 99244 OFF/OP CNSLTJ NEW/EST MOD 40: CPT | Performed by: INTERNAL MEDICINE

## 2025-04-03 NOTE — PROGRESS NOTES
Cardiology Consultation     Jimbo Suresh  [id number]  1950  HEART & VASCULAR [facility] [place]  [address] [place] [address]  [facility] [id number]    Orders Placed This Encounter   Procedures    Holter monitor         Assessment & Plan  Irregular heart rhythm  Noted to have frequent PVCs on his EKG. Check 48-hour Holter monitor.  PVC (premature ventricular contraction)  Noted to have frequent PVCs on his EKG. Check 48-hour Holter monitor.  Dyspnea on exertion  Symptoms of shortness of breath when walking up flight of stairs or doing something heavy with exertion. Risk factors include age, hypertension, dyslipidemia. PVCs noted. Check exercise stress echo.  Essential hypertension  Blood pressure controlled with amlodipine  Hyperlipidemia, unspecified hyperlipidemia type  Controlled with 40 mg of atorvastatin.        History of Present Illness:    74-year-old man comes to the office for symptoms of shortness of breath on exertion. Describes this particularly when climbing up a flight of stairs carrying something heavy.    Denies any prior cardiac history. He does have hypertension and dyslipidemia. Typically well-managed.    He had an EKG done at his primary care physician's office recently which showed frequent PVCs. He does not feel any palpitations. No history of syncope or presyncope.        Patient Active Problem List   Diagnosis    BPH associated with nocturia    Osteoarthritis of both hands    Essential hypertension    Erectile dysfunction    Hyperlipidemia    Joint swelling    Snoring    Acute right-sided low back pain with right-sided sciatica    Right hip pain    Microscopic hematuria    Impaired fasting glucose    Need for RSV immunization    Encounter for annual physical exam    Other insomnia    Rash    Irregular heart rhythm     Past Medical History:   Diagnosis Date    Age-related cataract of both eyes 02/02/2024    Blurry vision,  bilateral 10/24/2023    BPH (benign prostatic hyperplasia)     Cataract 03/22/2024    Erectile dysfunction     Hypertension     Medical history unknown      Social History     Tobacco Use    Smoking status: Former    Smokeless tobacco: Never   Vaping Use    Vaping status: Never Used   Substance Use Topics    Alcohol use: Yes     Comment: Social Drinker     Drug use: No      Family History   Problem Relation Age of Onset    Parkinsonism Mother         Symptomatic Parkinson Disease     Coronary artery disease Father     Heart attack Father      Past Surgical History:   Procedure Laterality Date    HERNIA REPAIR      Last Assessed: 12/6/2017       Current Outpatient Medications:     amLODIPine (NORVASC) 10 mg tablet, Take 1 tablet by mouth once daily, Disp: 100 tablet, Rfl: 1    atorvastatin (LIPITOR) 40 mg tablet, Take 1 tablet by mouth once daily, Disp: 100 tablet, Rfl: 1    neomycin-bacitracin-polymyxin (NEOSPORIN) 5-400-5,000 ointment, Apply topically 2 (two) times a day, Disp: 3.5 g, Rfl: 0    tadalafil (CIALIS) 10 MG tablet, Take 1 tablet (10 mg total) by mouth daily as needed for erectile dysfunction, Disp: 10 tablet, Rfl: 0    terazosin (HYTRIN) 2 mg capsule, Take 1 capsule by mouth once daily at bedtime, Disp: 90 capsule, Rfl: 1    valACYclovir (VALTREX) 1,000 mg tablet, Take 1 tablet (1,000 mg total) by mouth 3 (three) times a day for 7 days (Patient not taking: Reported on 4/3/2025), Disp: 21 tablet, Rfl: 0  No Known Allergies    Vitals:    04/03/25 1349   BP: 132/74   BP Location: Right arm   Patient Position: Sitting   Cuff Size: Standard   Pulse: 84   SpO2: 97%   Weight: 77.6 kg (171 lb)     Vitals:    04/03/25 1349   Weight: 77.6 kg (171 lb)          Body mass index is 28.02 kg/m².    Physical Exam:   GENERAL: Alert, well appearing, and in no distress  HEENT:  PERRL, EOMI, no scleral icterus, no conjunctival pallor  NECK:  Supple, No elevated JVP, no thyromegaly, no carotid bruits  HEART:   "extrasystoles.  LUNGS:  Clear to auscultation bilaterally  ABDOMEN:  Soft, non-tender, positive bowel sounds, no rebound or guarding  EXTREMITIES:  No edema  VASCULAR:  Normal pedal pulses   NEURO: No focal deficits,  SKIN: Normal without suspicious lesions on exposed skin      ROS:  Shortness of breath on exertion.  Except as noted in HPI, is otherwise reviewed in detail and a 12 point review of systems is negative.    Labs:  Lab Results   Component Value Date    SODIUM 137 12/04/2024    K 4.0 12/04/2024     12/04/2024    CREATININE 0.76 12/04/2024    BUN 13 12/04/2024    CO2 27 12/04/2024    ALT 24 12/04/2024    AST 19 12/04/2024    GLUF 95 12/04/2024    HGBA1C 5.6 07/23/2024    WBC 10.39 (H) 07/23/2024    HGB 14.6 07/23/2024    HCT 46.6 07/23/2024     07/23/2024       No results found for: \"CHOL\"  Lab Results   Component Value Date    HDL 65 07/23/2024    HDL 69 02/03/2023    HDL 56 03/04/2020     Lab Results   Component Value Date    LDLCALC 50 07/23/2024    LDLCALC 46 02/03/2023    LDLCALC 122 (H) 03/04/2020     Lab Results   Component Value Date    TRIG 103 07/23/2024    TRIG 80 02/03/2023    TRIG 127 03/04/2020       EKG:  Sinus with frequent PVCs.    "

## 2025-04-16 ENCOUNTER — HOSPITAL ENCOUNTER (OUTPATIENT)
Dept: NON INVASIVE DIAGNOSTICS | Facility: CLINIC | Age: 75
Discharge: HOME | End: 2025-04-16
Payer: MEDICARE

## 2025-04-16 VITALS
DIASTOLIC BLOOD PRESSURE: 80 MMHG | SYSTOLIC BLOOD PRESSURE: 142 MMHG | OXYGEN SATURATION: 97 % | HEART RATE: 81 BPM | BODY MASS INDEX: 27.48 KG/M2 | WEIGHT: 171 LBS | HEIGHT: 66 IN

## 2025-04-16 DIAGNOSIS — I49.9 IRREGULAR HEART RHYTHM: ICD-10-CM

## 2025-04-16 DIAGNOSIS — I49.3 PVC (PREMATURE VENTRICULAR CONTRACTION): ICD-10-CM

## 2025-04-16 DIAGNOSIS — I10 ESSENTIAL HYPERTENSION: ICD-10-CM

## 2025-04-16 DIAGNOSIS — R06.09 DYSPNEA ON EXERTION: ICD-10-CM

## 2025-04-16 LAB
AORTIC ROOT: 3.3 CM
ASCENDING AORTA: 3.5 CM
CHEST PAIN STATEMENT: NORMAL
DOP CALC LVOT AREA: 2.54 CM2
DOP CALC LVOT DIAMETER: 1.8 CM
E WAVE DECELERATION TIME: 190 MS
E/A RATIO: 0.84
LEFT VENTRICLE DIASTOLIC VOLUME (MOD BIPLANE): 130 ML
LEFT VENTRICLE SYSTOLIC VOLUME (MOD BIPLANE): 76 ML
LV EF BIPLANE MOD: 41 %
LV EF US.2D.A4C+ESTIMATED: 36 %
MAX DIASTOLIC BP: 84 MMHG
MAX HR PERCENT: 90 %
MAX HR: 134 BPM
MAX PREDICTED HEART RATE: 148 BPM
MV E'TISSUE VEL-LAT: 12 CM/S
MV E'TISSUE VEL-SEP: 11 CM/S
MV PEAK A VEL: 1.09 M/S
MV PEAK E VEL: 92 CM/S
PROTOCOL NAME: NORMAL
RATE PRESSURE PRODUCT: NORMAL
RIGHT VENTRICLE ID DIMENSION: 3.6 CM
SINOTUBULAR JUNCTION: 2.6 CM
SL CV LV EF: 50
SL CV SINUS OF VALSALVA 2D: 3.5 CM
SL CV STRESS RECOVERY BP: NORMAL MMHG
SL CV STRESS RECOVERY HR: 102 BPM
SL CV STRESS RECOVERY O2 SAT: 98 %
SL CV STRESS STAGE REACHED: 2
STJ: 2.6 CM
STRESS ANGINA INDEX: 0
STRESS BASELINE BP: NORMAL MMHG
STRESS BASELINE HR: 85 BPM
STRESS O2 SAT REST: 97 %
STRESS PEAK HR: 134 BPM
STRESS POST ESTIMATED WORKLOAD: 7 METS
STRESS POST EXERCISE DUR MIN: 4 MIN
STRESS POST EXERCISE DUR MIN: 4 MIN
STRESS POST EXERCISE DUR SEC: 0 SEC
STRESS POST EXERCISE DUR SEC: 0 SEC
STRESS POST O2 SAT PEAK: 98 %
STRESS POST PEAK BP: 160 MMHG
STRESS POST PEAK HR: 134 BPM
STRESS POST PEAK SYSTOLIC BP: 160 MMHG
TARGET HR FORMULA: NORMAL
TEST INDICATION: NORMAL

## 2025-04-16 PROCEDURE — 93350 STRESS TTE ONLY: CPT

## 2025-04-16 PROCEDURE — 93225 XTRNL ECG REC<48 HRS REC: CPT

## 2025-04-16 PROCEDURE — 93226 XTRNL ECG REC<48 HR SCAN A/R: CPT

## 2025-04-16 PROCEDURE — 93350 STRESS TTE ONLY: CPT | Performed by: INTERNAL MEDICINE

## 2025-04-17 DIAGNOSIS — I44.7 LBBB (LEFT BUNDLE BRANCH BLOCK): Primary | ICD-10-CM

## 2025-04-22 PROCEDURE — 93227 XTRNL ECG REC<48 HR R&I: CPT | Performed by: INTERNAL MEDICINE

## 2025-04-23 ENCOUNTER — RESULTS FOLLOW-UP (OUTPATIENT)
Dept: NON INVASIVE DIAGNOSTICS | Facility: HOSPITAL | Age: 75
End: 2025-04-23

## 2025-04-24 ENCOUNTER — RESULTS FOLLOW-UP (OUTPATIENT)
Dept: CARDIOLOGY CLINIC | Facility: CLINIC | Age: 75
End: 2025-04-24

## 2025-04-24 ENCOUNTER — HOSPITAL ENCOUNTER (OUTPATIENT)
Dept: NON INVASIVE DIAGNOSTICS | Facility: CLINIC | Age: 75
Discharge: HOME | End: 2025-04-24
Attending: INTERNAL MEDICINE
Payer: MEDICARE

## 2025-04-24 VITALS
HEART RATE: 82 BPM | BODY MASS INDEX: 28.49 KG/M2 | SYSTOLIC BLOOD PRESSURE: 128 MMHG | HEIGHT: 65 IN | OXYGEN SATURATION: 98 % | DIASTOLIC BLOOD PRESSURE: 74 MMHG | WEIGHT: 171 LBS

## 2025-04-24 DIAGNOSIS — I44.7 LBBB (LEFT BUNDLE BRANCH BLOCK): ICD-10-CM

## 2025-04-24 LAB
CHEST PAIN STATEMENT: NORMAL
MAX DIASTOLIC BP: 68 MMHG
MAX PREDICTED HEART RATE: 148 BPM
PROTOCOL NAME: NORMAL
RATE PRESSURE PRODUCT: NORMAL
REASON FOR TERMINATION: NORMAL
SL CV REST NUCLEAR ISOTOPE DOSE: 10.34 MCI
SL CV STRESS NUCLEAR ISOTOPE DOSE: 30.1 MCI
SL CV STRESS RECOVERY BP: NORMAL MMHG
SL CV STRESS RECOVERY HR: 105 BPM
SL CV STRESS RECOVERY O2 SAT: 98 %
STRESS ANGINA INDEX: 0
STRESS BASELINE BP: NORMAL MMHG
STRESS BASELINE HR: 82 BPM
STRESS O2 SAT REST: 98 %
STRESS PEAK HR: 113 BPM
STRESS POST EXERCISE DUR MIN: 3 MIN
STRESS POST EXERCISE DUR SEC: 0 SEC
STRESS POST O2 SAT PEAK: 98 %
STRESS POST PEAK BP: 126 MMHG
STRESS POST PEAK HR: 113 BPM
STRESS POST PEAK SYSTOLIC BP: 130 MMHG
STRESS/REST PERFUSION RATIO: 1.13
TARGET HR FORMULA: NORMAL
TEST INDICATION: NORMAL

## 2025-04-24 PROCEDURE — 93018 CV STRESS TEST I&R ONLY: CPT | Performed by: INTERNAL MEDICINE

## 2025-04-24 PROCEDURE — 78452 HT MUSCLE IMAGE SPECT MULT: CPT | Performed by: INTERNAL MEDICINE

## 2025-04-24 PROCEDURE — 93017 CV STRESS TEST TRACING ONLY: CPT

## 2025-04-24 PROCEDURE — 78452 HT MUSCLE IMAGE SPECT MULT: CPT

## 2025-04-24 PROCEDURE — A9502 TC99M TETROFOSMIN: HCPCS

## 2025-04-24 PROCEDURE — 93016 CV STRESS TEST SUPVJ ONLY: CPT | Performed by: INTERNAL MEDICINE

## 2025-04-24 RX ORDER — REGADENOSON 0.08 MG/ML
0.4 INJECTION, SOLUTION INTRAVENOUS ONCE
Status: COMPLETED | OUTPATIENT
Start: 2025-04-24 | End: 2025-04-24

## 2025-04-24 RX ADMIN — REGADENOSON 0.4 MG: 0.08 INJECTION, SOLUTION INTRAVENOUS at 10:56

## 2025-05-28 ENCOUNTER — OFFICE VISIT (OUTPATIENT)
Dept: INTERNAL MEDICINE CLINIC | Facility: CLINIC | Age: 75
End: 2025-05-28
Payer: MEDICARE

## 2025-05-28 VITALS
RESPIRATION RATE: 16 BRPM | DIASTOLIC BLOOD PRESSURE: 78 MMHG | WEIGHT: 169 LBS | SYSTOLIC BLOOD PRESSURE: 122 MMHG | OXYGEN SATURATION: 98 % | BODY MASS INDEX: 28.16 KG/M2 | TEMPERATURE: 97.8 F | HEART RATE: 87 BPM | HEIGHT: 65 IN

## 2025-05-28 DIAGNOSIS — I10 ESSENTIAL HYPERTENSION: ICD-10-CM

## 2025-05-28 DIAGNOSIS — S46.212A STRAIN OF LEFT BICEPS, INITIAL ENCOUNTER: Primary | ICD-10-CM

## 2025-05-28 DIAGNOSIS — R35.1 BPH ASSOCIATED WITH NOCTURIA: ICD-10-CM

## 2025-05-28 DIAGNOSIS — N40.1 BPH ASSOCIATED WITH NOCTURIA: ICD-10-CM

## 2025-05-28 DIAGNOSIS — N52.9 ERECTILE DYSFUNCTION, UNSPECIFIED ERECTILE DYSFUNCTION TYPE: ICD-10-CM

## 2025-05-28 PROCEDURE — 99214 OFFICE O/P EST MOD 30 MIN: CPT

## 2025-05-28 RX ORDER — CAPSAICIN 0.025 %
1 CREAM (GRAM) TOPICAL 2 TIMES DAILY
Qty: 25 G
Start: 2025-05-28 | End: 2025-06-27

## 2025-05-28 RX ORDER — TERAZOSIN 2 MG/1
2 CAPSULE ORAL
Qty: 90 CAPSULE | Refills: 1 | Status: SHIPPED | OUTPATIENT
Start: 2025-05-28

## 2025-05-28 RX ORDER — CAPSAICIN 0.025 %
1 CREAM (GRAM) TOPICAL 2 TIMES DAILY
Qty: 60 G | Refills: 2 | Status: SHIPPED | OUTPATIENT
Start: 2025-05-28 | End: 2025-05-28

## 2025-05-28 RX ORDER — TADALAFIL 10 MG/1
10 TABLET ORAL DAILY PRN
Qty: 10 TABLET | Refills: 0 | Status: SHIPPED | OUTPATIENT
Start: 2025-05-28

## 2025-05-28 NOTE — ASSESSMENT & PLAN NOTE
Much improved with medication, nocturia 0-2x/night   Denies urinary retention, hematuria, flank pain, dysuria   Orders:    terazosin (HYTRIN) 2 mg capsule; Take 1 capsule (2 mg total) by mouth daily at bedtime    CBC and differential; Future    Comprehensive metabolic panel; Future

## 2025-05-28 NOTE — PROGRESS NOTES
Name: Jimbo Suresh      : 1950      MRN: [id number]  Encounter Provider: Charity Altman MD  Encounter Date: 2025   Encounter department: [facility] INTERNAL MEDICINE [place]  :  Assessment & Plan  Strain of left biceps, initial encounter  Complaining of L arm pain onset around 1 week ago   Woke him up from sleep, endorses lifting cases of water which may have been the trigger   Denies other trauma, falls, rashes, fevers     Plan-   Conservative management for now  Heat/Ice, Capsaicin cream, Tylenol PRN   Will hold on any imaging for now, RTO if sx worsen or do not resolve     Orders:    CBC and differential; Future    capsaicin (Zostrix) cream; Apply 1 small application topically 2 (two) times a day To affected area.    BPH associated with nocturia  Much improved with medication, nocturia 0-2x/night   Denies urinary retention, hematuria, flank pain, dysuria   Orders:    terazosin (HYTRIN) 2 mg capsule; Take 1 capsule (2 mg total) by mouth daily at bedtime    CBC and differential; Future    Comprehensive metabolic panel; Future    Erectile dysfunction, unspecified erectile dysfunction type    Orders:    tadalafil (CIALIS) 10 MG tablet; Take 1 tablet (10 mg total) by mouth daily as needed for erectile dysfunction    Essential hypertension  BP well controlled, 122/78 in office   Home regimen includes Norvasc 10mg QD     Orders:    Comprehensive metabolic panel; Future    Albumin / creatinine urine ratio; Future    BMI 28.0-28.9,adult  Weight overall stable, no weight gain or unintentional weight loss, polyuria/polydipsia/polyphagia, or heat/cold intolerance     Orders:    Lipid panel; Future    Hemoglobin A1C; Future           History of Present Illness   Mr. Suresh is a 75 yo M with a PMH of HTN and BPH who presents to the office for a follow up visit. He endorses that he has had some L arm pain onset around 1 week ago that woke him from a sleep one night. He states that he had been lifting some cases  "of water but denies any other strenuous activity. Patient also notes that his ROM is limited secondary to pain. Patient denies trauma, falls, rashes, joint pain/swelling, inability to move arm, fevers, numbness, tingling, weakness, or any other sx at this time.       Review of Systems   Constitutional:  Negative for activity change, appetite change, chills, fatigue and fever.   HENT:  Negative for congestion, ear pain and sore throat.    Eyes:  Negative for pain and visual disturbance.   Respiratory:  Negative for cough, chest tightness and shortness of breath.    Cardiovascular:  Negative for chest pain, palpitations and leg swelling.   Gastrointestinal:  Negative for abdominal distention, abdominal pain, blood in stool, constipation, diarrhea, nausea and vomiting.   Endocrine: Negative for cold intolerance, heat intolerance, polydipsia, polyphagia and polyuria.   Genitourinary:  Negative for decreased urine volume, difficulty urinating, dysuria, enuresis, flank pain, frequency, hematuria, penile pain and urgency.   Musculoskeletal:  Positive for myalgias. Negative for arthralgias, back pain, joint swelling, neck pain and neck stiffness.   Skin:  Negative for color change and rash.   Neurological:  Negative for dizziness, seizures, syncope and headaches.   Hematological:  Does not bruise/bleed easily.   Psychiatric/Behavioral:  Negative for agitation and confusion.    All other systems reviewed and are negative.      Objective   /78 (BP Location: Left arm, Patient Position: Sitting, Cuff Size: Adult)   Pulse 87   Temp 97.8 °F (36.6 °C) (Tympanic)   Resp 16   Ht 5' 5\" (1.651 m)   Wt 76.7 kg (169 lb)   SpO2 98%   BMI 28.12 kg/m²      Physical Exam  Vitals and nursing note reviewed.   Constitutional:       General: He is not in acute distress.     Appearance: He is well-developed. He is not ill-appearing, toxic-appearing or diaphoretic.   HENT:      Head: Normocephalic and atraumatic.      Mouth/Throat: "      Mouth: Mucous membranes are moist.     Eyes:      Extraocular Movements: Extraocular movements intact.      Conjunctiva/sclera: Conjunctivae normal.      Pupils: Pupils are equal, round, and reactive to light.       Cardiovascular:      Rate and Rhythm: Normal rate and regular rhythm.      Pulses: Normal pulses.      Heart sounds: Normal heart sounds. No murmur heard.     No friction rub. No gallop.   Pulmonary:      Effort: Pulmonary effort is normal. No respiratory distress.      Breath sounds: Normal breath sounds. No wheezing or rhonchi.   Chest:      Chest wall: No tenderness.   Abdominal:      General: There is no distension.      Palpations: Abdomen is soft.      Tenderness: There is no abdominal tenderness. There is no right CVA tenderness, left CVA tenderness, guarding or rebound.     Musculoskeletal:         General: Normal range of motion.      Right upper arm: Normal.      Left upper arm: Tenderness present.        Arms:       Cervical back: Normal range of motion and neck supple.      Right lower leg: No edema.      Left lower leg: No edema.      Comments: Tenderness to the bicep muscle, limited ROM secondary to pain, no rash, hematoma, neurovascular compromise     Skin:     General: Skin is warm and dry.      Capillary Refill: Capillary refill takes less than 2 seconds.     Neurological:      General: No focal deficit present.      Mental Status: He is alert and oriented to person, place, and time.      Cranial Nerves: No cranial nerve deficit.      Sensory: No sensory deficit.      Motor: No weakness.      Coordination: Coordination normal.      Gait: Gait normal.     Psychiatric:         Mood and Affect: Mood normal.         Behavior: Behavior normal.         Thought Content: Thought content normal.

## 2025-05-28 NOTE — ASSESSMENT & PLAN NOTE
BP well controlled, 122/78 in office   Home regimen includes Norvasc 10mg QD     Orders:    Comprehensive metabolic panel; Future    Albumin / creatinine urine ratio; Future

## 2025-07-10 ENCOUNTER — OFFICE VISIT (OUTPATIENT)
Dept: INTERNAL MEDICINE CLINIC | Facility: CLINIC | Age: 75
End: 2025-07-10
Payer: MEDICARE

## 2025-07-10 ENCOUNTER — HOSPITAL ENCOUNTER (OUTPATIENT)
Dept: RADIOLOGY | Facility: HOSPITAL | Age: 75
Discharge: HOME | End: 2025-07-10
Payer: MEDICARE

## 2025-07-10 VITALS
BODY MASS INDEX: 28.32 KG/M2 | DIASTOLIC BLOOD PRESSURE: 80 MMHG | WEIGHT: 170 LBS | SYSTOLIC BLOOD PRESSURE: 122 MMHG | RESPIRATION RATE: 16 BRPM | HEART RATE: 84 BPM | TEMPERATURE: 97.6 F | OXYGEN SATURATION: 97 % | HEIGHT: 65 IN

## 2025-07-10 DIAGNOSIS — M25.512 ACUTE PAIN OF LEFT SHOULDER: Primary | ICD-10-CM

## 2025-07-10 DIAGNOSIS — I10 ESSENTIAL HYPERTENSION: ICD-10-CM

## 2025-07-10 DIAGNOSIS — M25.422 ELBOW SWELLING, LEFT: ICD-10-CM

## 2025-07-10 DIAGNOSIS — M25.512 ACUTE PAIN OF LEFT SHOULDER: ICD-10-CM

## 2025-07-10 DIAGNOSIS — Z91.81 HISTORY OF FALLING: ICD-10-CM

## 2025-07-10 PROCEDURE — 73080 X-RAY EXAM OF ELBOW: CPT

## 2025-07-10 PROCEDURE — 73030 X-RAY EXAM OF SHOULDER: CPT

## 2025-07-10 PROCEDURE — 99213 OFFICE O/P EST LOW 20 MIN: CPT

## 2025-07-10 NOTE — PROGRESS NOTES
Name: Jimbo Suresh      : 1950      MRN: [id number]  Encounter Provider: Chandra Ellison MD  Encounter Date: 7/10/2025   Encounter department: [facility] INTERNAL MEDICINE [place]  :  Assessment & Plan  Acute pain of left shoulder  Patient has complaints of 2-month history of left shoulder pain  Which is exacerbated with abduction and flexion of the left arm  Stable at rest, but exacerbated with movement and upon sleeping.  Differential diagnosis includes possible osteoarthritis versus tendinitis versus subacromial bursitis versus other    Plan  Will obtain x-ray of shoulder  Refer for physical therapy  Patient may take Tylenol as needed for pain, not to exceed 3 g in 24 hours  Conservative management for now, may consider orthopedic referral in the future  Orders:    XR shoulder 2+ vw left; Future    Ambulatory Referral to Physical Therapy; Future    Elbow swelling, left  Status post fall and trauma to the left elbow proximately 25 days ago  Patient denies any complaints of pain at present  However still has complaints of left elbow swelling    Plan  Follow-up x-ray of the left elbow  Orders:    XR elbow 3+ vw left; Future    Essential hypertension  BP today 122/80  Stable on amlodipine 10 mg daily       History of falling  Patient presents with history of fall approximately 25 days ago  Mechanical in nature  Denies any prodromal symptoms including lightheadedness, dizziness, palpitations or chest pain.  He does use tadalafil 10 mg daily as needed, however denies any orthostatic symptoms.              History of Present Illness   Patient is a 74-year-old male with past medical history of hypertension, BPH, erectile dysfunction who presents for follow-up visit    He has complaints of a fall that occurred approximately 25 days ago, while at a restaurant, it was mechanical in nature, patient denies any prodromal symptoms.  He fell backwards from a bench and hit his left elbow on the ground as well as his  "head.  He denies any headaches or neurological symptoms.  He denies any dizziness lightheadedness or prodromal symptoms.  He states that his left elbow was painful at first but, but the pain has subsided.  He does still have complaints of swelling at the left elbow.     Patient also has complaints of left-sided shoulder pain that began approximately 2 months ago, he states it is more bothersome at night when sleeping and when performing lifting motions, he denies any preceding trauma, but does state that he may have been lifting heavy objects prior to this episode.  He states the pain is typically pressure-like sensation and typically a 0 at rest and 6 out of 10 when it flares up.  He does state relief with Tylenol.    Is also endorse a history of osteoarthritis of the bilateral hands.    Hypertension : Blood pressure well-controlled, 122/80.  Patient takes amlodipine 10 mg.    Fall  Pertinent negatives include no abdominal pain, fever, hematuria, nausea or vomiting.     Review of Systems   Constitutional:  Negative for chills and fever.   HENT:  Negative for sore throat.    Eyes:  Negative for pain and visual disturbance.   Respiratory:  Negative for cough and shortness of breath.    Cardiovascular:  Negative for chest pain, palpitations and leg swelling.   Gastrointestinal:  Negative for abdominal pain, blood in stool, constipation, diarrhea, nausea and vomiting.   Genitourinary:  Negative for dysuria and hematuria.   Musculoskeletal:  Positive for arthralgias. Negative for back pain.   Skin:  Negative for color change and rash.   Neurological:  Negative for dizziness, syncope and light-headedness.   All other systems reviewed and are negative.      Objective   /80 (BP Location: Right arm, Patient Position: Sitting, Cuff Size: Adult)   Pulse 84   Temp 97.6 °F (36.4 °C) (Tympanic)   Resp 16   Ht 5' 5\" (1.651 m)   Wt 77.1 kg (170 lb)   SpO2 97%   BMI 28.29 kg/m²      Physical Exam  Vitals and nursing " note reviewed.   Constitutional:       General: He is not in acute distress.     Appearance: He is well-developed.   HENT:      Head: Normocephalic and atraumatic.     Eyes:      Conjunctiva/sclera: Conjunctivae normal.       Cardiovascular:      Rate and Rhythm: Normal rate and regular rhythm.      Heart sounds: No murmur heard.  Pulmonary:      Effort: Pulmonary effort is normal. No respiratory distress.      Breath sounds: Normal breath sounds.   Abdominal:      Palpations: Abdomen is soft.      Tenderness: There is no abdominal tenderness.     Musculoskeletal:         General: Swelling present. No tenderness.      Cervical back: Neck supple.      Comments: Fluctuant soft tissue swelling overlying the left elbow, approximately 3 to 4 cm in diameter, no erythema warmth or tenderness.  Normal range of motion    Positive empty can test in left shoulder, normal strength.     Skin:     General: Skin is warm and dry.      Capillary Refill: Capillary refill takes less than 2 seconds.     Neurological:      Mental Status: He is alert.     Psychiatric:         Mood and Affect: Mood normal.

## 2025-07-10 NOTE — ASSESSMENT & PLAN NOTE
Patient presents with history of fall approximately 25 days ago  Mechanical in nature  Denies any prodromal symptoms including lightheadedness, dizziness, palpitations or chest pain.  He does use tadalafil 10 mg daily as needed, however denies any orthostatic symptoms.

## 2025-07-17 ENCOUNTER — EVALUATION (OUTPATIENT)
Dept: PHYSICAL THERAPY | Facility: CLINIC | Age: 75
End: 2025-07-17
Payer: MEDICARE

## 2025-07-17 DIAGNOSIS — M54.12 CERVICAL RADICULOPATHY: ICD-10-CM

## 2025-07-17 DIAGNOSIS — M25.512 ACUTE PAIN OF LEFT SHOULDER: Primary | ICD-10-CM

## 2025-07-17 PROCEDURE — 97112 NEUROMUSCULAR REEDUCATION: CPT | Performed by: PHYSICAL THERAPIST

## 2025-07-17 PROCEDURE — 97161 PT EVAL LOW COMPLEX 20 MIN: CPT | Performed by: PHYSICAL THERAPIST

## 2025-07-17 PROCEDURE — 97110 THERAPEUTIC EXERCISES: CPT | Performed by: PHYSICAL THERAPIST

## 2025-07-17 NOTE — PROGRESS NOTES
PT Evaluation     Today's date: 2025  Patient name: iJmbo Suresh  : 1950  MRN: [id number]  Referring provider: Kallie Knight MD  Dx:   Encounter Diagnosis     ICD-10-CM    1. Acute pain of left shoulder  M25.512 Ambulatory Referral to Physical Therapy      2. Cervical radiculopathy  M54.12                      Assessment  Impairments: abnormal or restricted ROM, activity intolerance, impaired balance, impaired physical strength, lacks appropriate home exercise program, pain with function, poor posture , poor body mechanics, activity limitations and endurance    Assessment details: Pt presents with signs and symptoms synonymous of admitting diagnosis as well as possible cervical radiculopathy.  Mechanical assessment demonstrated cervical derangement with DP of Ret with Pt OP.  Pt presents with pain, decreased strength, decreased range, flexibility, as well as tolerance to activity and postural awareness.  Pt would benefit skilled PT intervention in order to address these impairments in order to be able to perform all desired activities with minimal to nil symptom exacerbation.  Based on today's session he will likely have a strong outcome.  Thank you very much for this referral.     Understanding of Dx/Px/POC: good     Prognosis: good    Goals  STG 4 Weeks:  Decrease pain at worst to 5  Improve range to CS ROM to min, Shoulder range by 15 in all planes.  Improve strength to ER to 4 L  Independent with HEP  LTG 8 Weeks:  Decrease pain at worst to 2  Improve range to CS ROM to Nil, Shoulder range equal that of R.   Improve strength to 5/5 ER L.    Able to perform all desired activities with minimal to nil symptom exacerbation      Plan  Patient would benefit from: skilled physical therapy  Planned modality interventions: cryotherapy and thermotherapy: hydrocollator packs    Planned therapy interventions: abdominal trunk stabilization, activity modification, joint mobilization, manual therapy,  behavior modification, body mechanics training, neuromuscular re-education, postural training, strengthening, stretching, therapeutic activities, therapeutic exercise, therapeutic training, transfer training, IADL retraining, home exercise program, graded motor, graded exercise, graded activity, gait training, functional ROM exercises and flexibility    Frequency: 2x week  Duration in weeks: 10        Subjective Evaluation    History of Present Illness  Date of onset: 7/17/2025  Mechanism of injury: Pt is a 74 yomale who is RHD and presents today stating that he developed L shoulder pain several years ago.  Pt reports it got better.  Pt reports that the L shoulder he developed pain about 2 months ago, cannot specifically contribute it to anything however maybe due to lifting a big tray of water.  Pt reports that the pain is intermittent.  Hurts with movements in all planes, he will get some symptoms into arm but not hand.  No history of head aches, no distinguishable history of neck pain, R arm is doing well.  Pt reports that the symptoms are about the same.  He reports pain 8/10 usually with reaching out to the side this is the symptoms that can reach to his forearm.  Once done moving symptoms reduce.   Difficulty falling asleep, can awaken due to pain, however will be able to fall back asleep.    Reports that he is having pain with sleeping on L side, this is his preferred side.  Pt reports that he is sleeping on the R side not his back or belly.  Pt reports goals are to reduce pain, improve mobility of the arm, be able to lift items and get back to how he was 2 months.    X-ray performed (-) of abnormalities.    Pt is a referral from his family physician.    Pt has large swelling in his L elbow after sustaining a fall about 2.5 weeks ago.  This did not effect the shoulder.    Quality of life: good    Patient Goals  Patient goals for therapy: increased strength, return to sport/leisure activities, increased motion  and decreased pain    Pain  Current pain ratin  At best pain ratin  At worst pain ratin  Quality: dull ache, sharp and tight  Relieving factors: change in position, rest and medications  Aggravating factors: overhead activity and lifting  Progression: no change      Diagnostic Tests  X-ray: normal        Objective     Active Range of Motion   Left Shoulder   Flexion: 145 degrees with pain  Extension: 90 degrees   Abduction: 150 degrees with pain  External rotation BTH: C7 with pain  Internal rotation BTB: L5 with pain  Horizontal adduction: WFL    Right Shoulder   Flexion: 165 degrees   Extension: 90 degrees   Abduction: 160 degrees   External rotation BTH: C7   Internal rotation BTB: T8   Horizontal adduction: Right shoulder active horizontal adduction: WFL. WFL    Additional Active Range of Motion Details  Forward head, rounded shoulders  B/L Sensation intact to light touch C3,4,5,6,7,8,T1,T2  DTR Bi Tri Brac  [place]  NT  Postural correction NE.  UE Screen all 5/5 except L ER 4-*  CS Repeated motion   Flex/ProERP neck nil  Retraction min.  P Neck NW.  Better Flex/ Abd, IR improved ROM, NB Pain. CS ROM improved LF and Ret/Ext.  ER to 5. No pain.   Ext ERP mod  SB R min  Rot R nil  SB L min  Nil Rot L  Joint Mobility  Palpation  Sts    Repeated motions UE               Precautions: HTN, Fall History      Insurance Billing Rule ReEval POC expires Auth Status Total   Visits  Start date  Expiration date PT/OT + Visit Limit? Co-Insurance Misc    Whole Care CMS 25 Pen BOMN 25 pend Pend no                                                            Visit/Unit Tracking  AUTH Status: Pend Date 25                    Visits  Authed: Pend Used 1                     Remaining                              Manuals                                                                 Neuro Re-Ed             Education and progression 10 Min            Sleeping positions performed                                                                              Ther Ex             CS Ret  4 x 5            CS Ret + Pt OP 5 x 5            Progression?                                                                               Ther Activity             CS ROM Ret + Ext mod to min            Shoulder Flex/Abd Better in pain and ROM            IR L5 to T10 pain            R ER MMT 4-* to 5.             Other concerns?             Modalities

## 2025-07-22 ENCOUNTER — OFFICE VISIT (OUTPATIENT)
Dept: PHYSICAL THERAPY | Facility: CLINIC | Age: 75
End: 2025-07-22
Payer: MEDICARE

## 2025-07-22 DIAGNOSIS — M25.512 ACUTE PAIN OF LEFT SHOULDER: ICD-10-CM

## 2025-07-22 DIAGNOSIS — M54.12 CERVICAL RADICULOPATHY: Primary | ICD-10-CM

## 2025-07-22 PROCEDURE — 97110 THERAPEUTIC EXERCISES: CPT | Performed by: PHYSICAL THERAPIST

## 2025-07-22 NOTE — PROGRESS NOTES
"Daily Note     Today's date: 2025  Patient name: Jimbo Suresh  : 1950  MRN: [id number]  Referring provider: Kallie Knight MD  Dx:   Encounter Diagnosis     ICD-10-CM    1. Cervical radiculopathy  M54.12       2. Acute pain of left shoulder  M25.512           Start Time: 847  Stop Time: 924  Total time in clinic (min): 37 minutes    Subjective: Pt reports that he thinks his left shoulder and neck have been \"Maybe a little better,\" since his initial evaluation. He reports that he still experiences pain, but has noticed improvement. He states that sleeping has been better and notes that he was able to sleep through the night, but that it has not been consistent. He reports that left shoulder pain is his most pressing symptom. He states that he has performed his HEP four or five times per day since last session.       Objective: See treatment diary below      Assessment: Tolerated treatment well. Patient exhibited good technique with therapeutic exercises and would benefit from continued PT. Pt demonstrated no weakness or pain with resisted L shoulder ER this session. Pt exhibited positive response to cervical retraction, demonstrating increased cervical retraction and extension ROM, and improved fluidity of movement with L shoulder flexion and abduction, though 4-5/10 pain in the lateral/posterior upper arm persists. Pt exhibits the greatest difficulty, to include pain (6-7/10) with functional L IR despite ROM improving from L5 to T8 during today's repeated motions.      Plan: Continue per plan of care.      Precautions: HTN, Fall History      Insurance Billing Rule ReEval POC expires Auth Status Total   Visits  Start date  Expiration date PT/OT + Visit Limit? Co-Insurance Misc    Whole Care CMS 25 Pen BOMN 25 pend Pend no                                                            Visit/Unit Tracking  AUTH Status: Pend Date 25                   Visits  Authed: " Pend Used 1 2                    Remaining                              Manuals 7/17 7/22                                                               Neuro Re-Ed             Education and progression 10 Min 10'           Sleeping positions performed                                                                             Ther Ex             CS Ret  4 x 5 10 x 3           CS Ret + Pt OP 5 x 5 10 x 1           CS Ret + PT OP  5 x 1 - Flex/abd P! ^           CS Ret + pt OP in supine  10 x 2           CS Ret + Ext  5 x 2           Progression?                                                                                            Ther Activity             CS ROM Ret + Ext mod to min Ret + Ext mod to min           Shoulder Flex/Abd Better in pain and ROM Pain, not smooth to less pain, smoother           IR L5 to T10 pain Pain 6-7 unchanged, L5 to T8           R ER MMT 4-* to 5.  5, no pain           Other concerns?             Modalities

## 2025-07-24 ENCOUNTER — OFFICE VISIT (OUTPATIENT)
Dept: PHYSICAL THERAPY | Facility: CLINIC | Age: 75
End: 2025-07-24
Payer: MEDICARE

## 2025-07-24 DIAGNOSIS — M54.12 CERVICAL RADICULOPATHY: Primary | ICD-10-CM

## 2025-07-24 DIAGNOSIS — M25.512 ACUTE PAIN OF LEFT SHOULDER: ICD-10-CM

## 2025-07-24 PROCEDURE — 97110 THERAPEUTIC EXERCISES: CPT | Performed by: PHYSICAL THERAPIST

## 2025-07-24 NOTE — PROGRESS NOTES
Daily Note     Today's date: 2025  Patient name: Jimbo Suresh  : 1950  MRN: [id number]  Referring provider: Kallie Knight MD  Dx:   Encounter Diagnosis     ICD-10-CM    1. Cervical radiculopathy  M54.12       2. Acute pain of left shoulder  M25.512                      Subjective: Pt presents today stating that his neck increased pain and there was no change in the L UE.  Pt did not perform Ret + Ext yesterday.        Objective: See treatment diary below  Pain at back of neck and arm.    Pain with AROM IR and L2.      CS Ret + Ext review Better.  AROM Flex/Abd no pain, improved IR to T10 less pain.            Assessment: Reviewed HEP performance to allow pt to successfully understand the need of the activity and how to perform without provocation of pain.  Will follow up with pt next week, pt was in accord and understanding of it.  Continue to progress as able.      Plan: Continue per plan of care.      Precautions: HTN, Fall History      Insurance Billing Rule ReEval POC expires Auth Status Total   Visits  Start date  Expiration date PT/OT + Visit Limit? Co-Insurance Misc    Whole Care CMS 25 Pen BOMN 25 pend Pend no                                                            Visit/Unit Tracking  AUTH Status: Pend Date 25                  Visits  Authed: Pend Used 1 2 3                   Remaining                              Manuals                                                               Neuro Re-Ed             Education and progression 10 Min 10'           Sleeping positions performed                                                                             Ther Ex             CS Ret  4 x 5 10 x 3 5 x 3          CS Ret + Pt OP 5 x 5 10 x 1 5 x 3          CS Ret + PT OP  5 x 1 - Flex/abd P! ^           CS Ret + pt OP in supine  10 x 2           CS Ret + Ext  5 x 2 5 x 4          Progression?                                                                                             Ther Activity             CS ROM Ret + Ext mod to min Ret + Ext mod to min All planes without pain.           Shoulder Flex/Abd Better in pain and ROM Pain, not smooth to less pain, smoother improved          IR L5 to T10 pain Pain 6-7 unchanged, L5 to T8 Improved L          R ER MMT 4-* to 5.  5, no pain 5/5 nil          Other concerns?             Modalities

## 2025-07-30 ENCOUNTER — APPOINTMENT (OUTPATIENT)
Dept: PHYSICAL THERAPY | Facility: CLINIC | Age: 75
End: 2025-07-30
Payer: MEDICARE

## 2025-07-31 ENCOUNTER — EVALUATION (OUTPATIENT)
Dept: PHYSICAL THERAPY | Facility: CLINIC | Age: 75
End: 2025-07-31
Payer: MEDICARE

## 2025-07-31 DIAGNOSIS — M54.12 CERVICAL RADICULOPATHY: Primary | ICD-10-CM

## 2025-07-31 DIAGNOSIS — M25.512 ACUTE PAIN OF LEFT SHOULDER: ICD-10-CM

## 2025-07-31 PROCEDURE — 97110 THERAPEUTIC EXERCISES: CPT | Performed by: PHYSICAL THERAPIST

## 2025-07-31 PROCEDURE — 97112 NEUROMUSCULAR REEDUCATION: CPT | Performed by: PHYSICAL THERAPIST

## 2025-08-11 ENCOUNTER — EVALUATION (OUTPATIENT)
Dept: PHYSICAL THERAPY | Facility: CLINIC | Age: 75
End: 2025-08-11
Payer: MEDICARE